# Patient Record
Sex: MALE | Race: WHITE | Employment: OTHER | URBAN - METROPOLITAN AREA
[De-identification: names, ages, dates, MRNs, and addresses within clinical notes are randomized per-mention and may not be internally consistent; named-entity substitution may affect disease eponyms.]

---

## 2017-01-15 ENCOUNTER — APPOINTMENT (OUTPATIENT)
Dept: CT IMAGING | Age: 62
End: 2017-01-15
Attending: EMERGENCY MEDICINE
Payer: COMMERCIAL

## 2017-01-15 ENCOUNTER — APPOINTMENT (OUTPATIENT)
Dept: GENERAL RADIOLOGY | Age: 62
End: 2017-01-15
Attending: EMERGENCY MEDICINE
Payer: COMMERCIAL

## 2017-01-15 ENCOUNTER — HOSPITAL ENCOUNTER (EMERGENCY)
Age: 62
Discharge: HOME | End: 2017-01-15
Attending: EMERGENCY MEDICINE
Payer: COMMERCIAL

## 2017-01-15 VITALS
HEIGHT: 75 IN | BODY MASS INDEX: 26.12 KG/M2 | SYSTOLIC BLOOD PRESSURE: 161 MMHG | DIASTOLIC BLOOD PRESSURE: 70 MMHG | HEART RATE: 91 BPM | TEMPERATURE: 98.6 F | OXYGEN SATURATION: 96 % | RESPIRATION RATE: 16 BRPM | WEIGHT: 210.1 LBS

## 2017-01-15 DIAGNOSIS — N30.01 ACUTE CYSTITIS WITH HEMATURIA: Primary | ICD-10-CM

## 2017-01-15 LAB
ALBUMIN SERPL BCP-MCNC: 3.2 G/DL (ref 3.5–5)
ALBUMIN/GLOB SERPL: 1 {RATIO} (ref 1.1–2.2)
ALP SERPL-CCNC: 68 U/L (ref 45–117)
ALT SERPL-CCNC: 16 U/L (ref 12–78)
ANION GAP BLD CALC-SCNC: 11 MMOL/L (ref 5–15)
APPEARANCE UR: ABNORMAL
AST SERPL W P-5'-P-CCNC: 16 U/L (ref 15–37)
BACTERIA URNS QL MICRO: ABNORMAL /HPF
BASOPHILS # BLD AUTO: 0 K/UL (ref 0–0.1)
BASOPHILS # BLD: 0 % (ref 0–1)
BILIRUB SERPL-MCNC: 0.8 MG/DL (ref 0.2–1)
BILIRUB UR QL: NEGATIVE
BUN SERPL-MCNC: 20 MG/DL (ref 6–20)
BUN/CREAT SERPL: 18 (ref 12–20)
CALCIUM SERPL-MCNC: 8.3 MG/DL (ref 8.5–10.1)
CHLORIDE SERPL-SCNC: 100 MMOL/L (ref 97–108)
CO2 SERPL-SCNC: 25 MMOL/L (ref 21–32)
COLOR UR: ABNORMAL
CREAT SERPL-MCNC: 1.1 MG/DL (ref 0.7–1.3)
DIFFERENTIAL METHOD BLD: ABNORMAL
EOSINOPHIL # BLD: 0 K/UL (ref 0–0.4)
EOSINOPHIL NFR BLD: 0 % (ref 0–7)
EPITH CASTS URNS QL MICRO: ABNORMAL /LPF
ERYTHROCYTE [DISTWIDTH] IN BLOOD BY AUTOMATED COUNT: 13.5 % (ref 11.5–14.5)
GLOBULIN SER CALC-MCNC: 3.3 G/DL (ref 2–4)
GLUCOSE SERPL-MCNC: 96 MG/DL (ref 65–100)
GLUCOSE UR STRIP.AUTO-MCNC: NEGATIVE MG/DL
HCT VFR BLD AUTO: 32.1 % (ref 36.6–50.3)
HGB BLD-MCNC: 11 G/DL (ref 12.1–17)
HGB UR QL STRIP: ABNORMAL
KETONES UR QL STRIP.AUTO: NEGATIVE MG/DL
LACTATE SERPL-SCNC: 1.2 MMOL/L (ref 0.4–2)
LEUKOCYTE ESTERASE UR QL STRIP.AUTO: ABNORMAL
LYMPHOCYTES # BLD AUTO: 8 % (ref 12–49)
LYMPHOCYTES # BLD: 0.8 K/UL (ref 0.8–3.5)
MCH RBC QN AUTO: 33.4 PG (ref 26–34)
MCHC RBC AUTO-ENTMCNC: 34.3 G/DL (ref 30–36.5)
MCV RBC AUTO: 97.6 FL (ref 80–99)
MONOCYTES # BLD: 1.1 K/UL (ref 0–1)
MONOCYTES NFR BLD AUTO: 11 % (ref 5–13)
NEUTS SEG # BLD: 7.9 K/UL (ref 1.8–8)
NEUTS SEG NFR BLD AUTO: 81 % (ref 32–75)
NITRITE UR QL STRIP.AUTO: NEGATIVE
PH UR STRIP: 6 [PH] (ref 5–8)
PLATELET # BLD AUTO: 139 K/UL (ref 150–400)
POTASSIUM SERPL-SCNC: 3.8 MMOL/L (ref 3.5–5.1)
PROT SERPL-MCNC: 6.5 G/DL (ref 6.4–8.2)
PROT UR STRIP-MCNC: 100 MG/DL
RBC # BLD AUTO: 3.29 M/UL (ref 4.1–5.7)
RBC #/AREA URNS HPF: ABNORMAL /HPF (ref 0–5)
RBC MORPH BLD: ABNORMAL
SODIUM SERPL-SCNC: 136 MMOL/L (ref 136–145)
SP GR UR REFRACTOMETRY: 1.02 (ref 1–1.03)
UROBILINOGEN UR QL STRIP.AUTO: 1 EU/DL (ref 0.2–1)
WBC # BLD AUTO: 9.8 K/UL (ref 4.1–11.1)
WBC URNS QL MICRO: ABNORMAL /HPF (ref 0–4)

## 2017-01-15 PROCEDURE — 74176 CT ABD & PELVIS W/O CONTRAST: CPT

## 2017-01-15 PROCEDURE — 74011000258 HC RX REV CODE- 258: Performed by: EMERGENCY MEDICINE

## 2017-01-15 PROCEDURE — 87040 BLOOD CULTURE FOR BACTERIA: CPT | Performed by: EMERGENCY MEDICINE

## 2017-01-15 PROCEDURE — 87077 CULTURE AEROBIC IDENTIFY: CPT | Performed by: EMERGENCY MEDICINE

## 2017-01-15 PROCEDURE — 74011250636 HC RX REV CODE- 250/636: Performed by: EMERGENCY MEDICINE

## 2017-01-15 PROCEDURE — 93005 ELECTROCARDIOGRAM TRACING: CPT

## 2017-01-15 PROCEDURE — 77030029179 HC BAG URIN DRNG SIMS -A

## 2017-01-15 PROCEDURE — 77030005520 HC CATH URETH FOL38 BARD -A

## 2017-01-15 PROCEDURE — 87086 URINE CULTURE/COLONY COUNT: CPT | Performed by: EMERGENCY MEDICINE

## 2017-01-15 PROCEDURE — 80053 COMPREHEN METABOLIC PANEL: CPT | Performed by: EMERGENCY MEDICINE

## 2017-01-15 PROCEDURE — 87186 SC STD MICRODIL/AGAR DIL: CPT | Performed by: EMERGENCY MEDICINE

## 2017-01-15 PROCEDURE — 99285 EMERGENCY DEPT VISIT HI MDM: CPT

## 2017-01-15 PROCEDURE — 96361 HYDRATE IV INFUSION ADD-ON: CPT

## 2017-01-15 PROCEDURE — 81001 URINALYSIS AUTO W/SCOPE: CPT | Performed by: EMERGENCY MEDICINE

## 2017-01-15 PROCEDURE — 77030005530 HC CATH URETH FOL40 BARD -B

## 2017-01-15 PROCEDURE — 71010 XR CHEST PORT: CPT

## 2017-01-15 PROCEDURE — 96365 THER/PROPH/DIAG IV INF INIT: CPT

## 2017-01-15 PROCEDURE — 85025 COMPLETE CBC W/AUTO DIFF WBC: CPT | Performed by: EMERGENCY MEDICINE

## 2017-01-15 PROCEDURE — 83605 ASSAY OF LACTIC ACID: CPT | Performed by: EMERGENCY MEDICINE

## 2017-01-15 PROCEDURE — 36415 COLL VENOUS BLD VENIPUNCTURE: CPT | Performed by: EMERGENCY MEDICINE

## 2017-01-15 RX ORDER — CEPHALEXIN 500 MG/1
500 CAPSULE ORAL 3 TIMES DAILY
Qty: 21 CAP | Refills: 0 | Status: SHIPPED | OUTPATIENT
Start: 2017-01-15 | End: 2017-01-22

## 2017-01-15 RX ORDER — SODIUM CHLORIDE 0.9 % (FLUSH) 0.9 %
5-10 SYRINGE (ML) INJECTION AS NEEDED
Status: DISCONTINUED | OUTPATIENT
Start: 2017-01-15 | End: 2017-01-15 | Stop reason: HOSPADM

## 2017-01-15 RX ADMIN — SODIUM CHLORIDE 2859 ML: 900 INJECTION, SOLUTION INTRAVENOUS at 15:45

## 2017-01-15 RX ADMIN — CEFTRIAXONE 1 G: 1 INJECTION, POWDER, FOR SOLUTION INTRAMUSCULAR; INTRAVENOUS at 17:06

## 2017-01-15 NOTE — ED NOTES
Dr. Dominga Barrientos has reviewed discharge instructions with the patient. The patient verbalized understanding. Pt. A&Ox4, respirations even and unlabored. VS stable as noted in flowsheet. Ambulating independently from department with paperwork and prescriptions in hand.

## 2017-01-15 NOTE — ED NOTES
Pt self-catheterize with a 16fr coude tip catheter. Pt was provided with sterile gloves, catheter and a leg bag and observed while the procedure was done. Wife states urologist mentioned he might go home with a cath, so the catheter was left in and placed on a leg bag for now.

## 2017-01-15 NOTE — DISCHARGE INSTRUCTIONS

## 2017-01-15 NOTE — ED NOTES
Bedside shift change report given to [address] [place] [address] Avenue (oncoming nurse) by Sandhya Wright RN (offgoing nurse). Report included the following information SBAR, ED Summary, MAR and Recent Results.

## 2017-01-15 NOTE — ED PROVIDER NOTES
HPI Comments: Deanna Helm is a 57 y.o. male s/p laparoscopic sigmoidectomy with BL uretal stent placement/removal on 12/28/16 who presents ambulatory to ED [facility] ED with cc of \"dark, stronger smelling\" urine x 4-5 days. Pt additionally c/o headache x 3 days, mild chest tightness today, fever of 102. 8F today which improved on its own, generalized weakness, and decreased appetite. Pt has not taken any medication for his symptoms. Pt reports that he saw Dr. Shanthi Martines on 1/12/17 for f/u of urinary retention s/p surgery and had his leg bag removed. Pt reports he since been tai-cathing daily. Pt reports he called Dr. Shanthi Martines today who recommended he come to the ED for further evaluation. Pt denies any cough, congestion, abdominal pain, nausea, vomiting, diarrhea, constipation, CP, or SOB. PCP: Ila Chao MD  Colorectal Surgeon: Aramis Marley MD  Urologist: [facility]; has seen Dr. Dora Camacho and Dr. Tena Carter Hx: -tobacco, -EtOH, -illicit drug usage    There are no other complaints, changes or physical findings at this time. The history is provided by the patient. Past Medical History:   Diagnosis Date    Arthritis      gouty arthritis in feet & ankles    Gout     HTN (hypertension)        Past Surgical History:   Procedure Laterality Date    Hx urological       bladder    Hx orthopaedic       Lumbar surgery age 24    Hx colonoscopy  2010    Colonoscopy N/A 12/28/2016     COLONOSCOPY performed by Aidan Jones MD at [facility] 56 Hx gi  12/29/2016     LAPAROSCOPIC SIGMOID COLECTOMY/ CYSTOSCOPY/ BILATERAL URETERAL STENT PLACEMENT AND REMOVAL         Family History:   Problem Relation Age of Onset    Heart Disease Father     Diabetes Father     Heart Disease Brother        Social History     Social History    Marital status:      Spouse name: N/A    Number of children: N/A    Years of education: N/A     Occupational History    Not on file.      Social History Main Topics    Smoking status: Never Smoker    Smokeless tobacco: Never Used    Alcohol use No    Drug use: No    Sexual activity: Not on file     Other Topics Concern    Not on file     Social History Narrative         ALLERGIES: Review of patient's allergies indicates no known allergies. Review of Systems   Constitutional: Positive for appetite change (decreased) and fever. Negative for activity change and chills. HENT: Negative for congestion and sore throat. Eyes: Negative for pain and redness. Respiratory: Positive for chest tightness. Negative for cough and shortness of breath. Cardiovascular: Negative for chest pain and palpitations. Gastrointestinal: Negative for abdominal pain, constipation, diarrhea, nausea and vomiting. Genitourinary: Negative for dysuria, frequency and urgency. +\"darker, stronger-smelling\" urine   Musculoskeletal: Negative for back pain and neck pain. Skin: Negative for rash. Neurological: Positive for weakness (generalized) and headaches. Negative for syncope and light-headedness. Psychiatric/Behavioral: Negative for confusion. All other systems reviewed and are negative. Patient Vitals for the past 12 hrs:   Temp Pulse Resp BP SpO2   01/15/17 1700 - - - 150/74 95 %   01/15/17 1600 - - - 150/80 97 %   01/15/17 1500 - - - 144/76 95 %   01/15/17 1430 - - - 135/81 96 %   01/15/17 1410 - - - 133/79 97 %   01/15/17 1358 98.6 °F (37 °C) (!) 108 20 152/87 98 %         Physical Exam   Constitutional: He is oriented to person, place, and time. He appears well-developed and well-nourished. No distress. HENT:   Head: Normocephalic. Nose: Nose normal.   Mouth/Throat: Oropharynx is clear and moist. No oropharyngeal exudate. Eyes: Conjunctivae are normal. Pupils are equal, round, and reactive to light. No scleral icterus. Neck: Normal range of motion. Neck supple. No JVD present. No tracheal deviation present. No thyromegaly present. Cardiovascular: Normal rate, regular rhythm and intact distal pulses. Exam reveals no gallop and no friction rub. No murmur heard. Pulmonary/Chest: Effort normal and breath sounds normal. No stridor. No respiratory distress. He has no wheezes. He has no rales. Abdominal: Soft. Bowel sounds are normal. He exhibits no distension. There is no tenderness. There is no rebound and no guarding. Well healed laparoscopic scars in the lower abdomen and periumbilical area   Musculoskeletal: Normal range of motion. He exhibits no edema. Lymphadenopathy:     He has no cervical adenopathy. Neurological: He is alert and oriented to person, place, and time. No cranial nerve deficit. He exhibits normal muscle tone. Coordination normal.   Skin: Skin is warm and dry. No rash noted. He is not diaphoretic. No erythema. Psychiatric: He has a normal mood and affect. His behavior is normal.   Nursing note and vitals reviewed. MDM  Number of Diagnoses or Management Options  Acute cystitis with hematuria:   Diagnosis management comments: DDx: UTI, pneumonia, viral syndome       Amount and/or Complexity of Data Reviewed  Clinical lab tests: ordered and reviewed  Tests in the radiology section of CPT®: ordered and reviewed  Tests in the medicine section of CPT®: reviewed and ordered  Review and summarize past medical records: yes  Discuss the patient with other providers: yes (Urology)  Independent visualization of images, tracings, or specimens: yes    Risk of Complications, Morbidity, and/or Mortality  General comments: +UTI; afebrile here; vss; no increased wbc; ortiz placed; will dc with leg bag; pt already established with urology; ct without hydro or acute process; d/w urology; dc with keflex; pt agreed to return if any continued fevers, abd pain; Emily Messina MD      Patient Progress  Patient progress: stable    ED Course       Procedures    PROGRESS NOTE:  2:44 PM  Old records reviewed.  Pt was admitted on 11/17/16 for perforated diverticulitis which was drained percutaneously. He was then admitted on 12/28/16 by Dr. Negin Ba for laparoscopic sigmoidectomy. Also on 12/28/16 pt had a cystoscopy, BL uretal stent placement and subsequent removal by Dr. Los Rivera. Written by Lachelle Whiteside, ED Scribe, as dictated by Natalie Lindsay MD.    ED EKG interpretation: 15:19  Rhythm: normal sinus rhythm; and regular . Rate (approx.): 93; Axis: normal; GA Interval: normal; QRS interval: normal ; ST/T wave: normal; normal ekg; This EKG was interpreted by Natalie Lindsay MD,ED Provider. CONSULT NOTE:   4:26 PM  Natalie Lindsay MD spoke with Brynn Bose MD,   Specialty: Urology  Discussed pt's hx, disposition, and available diagnostic and imaging results. Reviewed care plans. Consultant agrees with plans as outlined. Dr. Bailey Argueta requested CT abdomen/pelvis and if negative d/c with ortiz + leg bag and PO antibiotics. Written by Brenton Hawkins, ED Scribe, as dictated by Natalie Lindsay MD.    LABORATORY TESTS:  Recent Results (from the past 12 hour(s))   EKG, 12 LEAD, INITIAL    Collection Time: 01/15/17  3:19 PM   Result Value Ref Range    Ventricular Rate 93 BPM    Atrial Rate 93 BPM    P-R Interval 152 ms    QRS Duration 84 ms    Q-T Interval 340 ms    QTC Calculation (Bezet) 422 ms    Calculated P Axis 17 degrees    Calculated R Axis 10 degrees    Calculated T Axis 48 degrees    Diagnosis       Normal sinus rhythm  Normal ECG  When compared with ECG of 22-DEC-2016 08:46,  Vent.  rate has increased BY  33 BPM     LACTIC ACID, PLASMA    Collection Time: 01/15/17  3:37 PM   Result Value Ref Range    Lactic acid 1.2 0.4 - 2.0 MMOL/L   URINALYSIS W/ RFLX MICROSCOPIC    Collection Time: 01/15/17  3:37 PM   Result Value Ref Range    Color YELLOW/STRAW      Appearance CLOUDY (A) CLEAR      Specific gravity 1.021 1.003 - 1.030      pH (UA) 6.0 5.0 - 8.0      Protein 100 (A) NEG mg/dL    Glucose NEGATIVE  NEG mg/dL    Ketone NEGATIVE NEG mg/dL    Bilirubin NEGATIVE  NEG      Blood LARGE (A) NEG      Urobilinogen 1.0 0.2 - 1.0 EU/dL    Nitrites NEGATIVE  NEG      Leukocyte Esterase SMALL (A) NEG      WBC 10-20 0 - 4 /hpf    RBC 20-50 0 - 5 /hpf    Epithelial cells FEW FEW /lpf    Bacteria 4+ (A) NEG /hpf   METABOLIC PANEL, COMPREHENSIVE    Collection Time: 01/15/17  3:37 PM   Result Value Ref Range    Sodium 136 136 - 145 mmol/L    Potassium 3.8 3.5 - 5.1 mmol/L    Chloride 100 97 - 108 mmol/L    CO2 25 21 - 32 mmol/L    Anion gap 11 5 - 15 mmol/L    Glucose 96 65 - 100 mg/dL    BUN 20 6 - 20 MG/DL    Creatinine 1.10 0.70 - 1.30 MG/DL    BUN/Creatinine ratio 18 12 - 20      GFR est AA >60 >60 ml/min/1.73m2    GFR est non-AA >60 >60 ml/min/1.73m2    Calcium 8.3 (L) 8.5 - 10.1 MG/DL    Bilirubin, total 0.8 0.2 - 1.0 MG/DL    ALT 16 12 - 78 U/L    AST 16 15 - 37 U/L    Alk. phosphatase 68 45 - 117 U/L    Protein, total 6.5 6.4 - 8.2 g/dL    Albumin 3.2 (L) 3.5 - 5.0 g/dL    Globulin 3.3 2.0 - 4.0 g/dL    A-G Ratio 1.0 (L) 1.1 - 2.2     CBC WITH AUTOMATED DIFF    Collection Time: 01/15/17  3:37 PM   Result Value Ref Range    WBC 9.8 4.1 - 11.1 K/uL    RBC 3.29 (L) 4.10 - 5.70 M/uL    HGB 11.0 (L) 12.1 - 17.0 g/dL    HCT 32.1 (L) 36.6 - 50.3 %    MCV 97.6 80.0 - 99.0 FL    MCH 33.4 26.0 - 34.0 PG    MCHC 34.3 30.0 - 36.5 g/dL    RDW 13.5 11.5 - 14.5 %    PLATELET 730 (L) 888 - 400 K/uL    NEUTROPHILS 81 (H) 32 - 75 %    LYMPHOCYTES 8 (L) 12 - 49 %    MONOCYTES 11 5 - 13 %    EOSINOPHILS 0 0 - 7 %    BASOPHILS 0 0 - 1 %    ABS. NEUTROPHILS 7.9 1.8 - 8.0 K/UL    ABS. LYMPHOCYTES 0.8 0.8 - 3.5 K/UL    ABS. MONOCYTES 1.1 (H) 0.0 - 1.0 K/UL    ABS. EOSINOPHILS 0.0 0.0 - 0.4 K/UL    ABS. BASOPHILS 0.0 0.0 - 0.1 K/UL    DF SMEAR SCANNED      RBC COMMENTS NORMOCYTIC, NORMOCHROMIC         IMAGING RESULTS:  CT Results  (Last 48 hours)               01/15/17 1726  CT ABD PELV WO CONT Final result    Impression:  IMPRESSION:       1.  Postoperative changes are noted from sigmoid colectomy. No drainable abscess   is identified. There is a small nonobstructing calculus lower pole left kidney. Narrative:  INDICATION: Abd Pain/KS; recent sigmoidectomy and ureteral stent placement 12/28       COMPARISON: 2016       TECHNIQUE:    Thin axial images were obtained through the abdomen and pelvis. Coronal and   sagittal reconstructions were generated. Oral contrast was not administered. CT   dose reduction was achieved through use of a standardized protocol tailored for   this examination and automatic exposure control for dose modulation. The absence of intravenous contrast material reduces the sensitivity for   evaluation of the solid parenchymal organs of the abdomen. FINDINGS:    LUNG BASES: There is a small focus of airspace disease in the lingula. There are   slight basilar atelectasis right greater than left. INCIDENTALLY IMAGED HEART AND MEDIASTINUM: Unremarkable. LIVER: No mass or biliary dilatation. There is a 5 mm low-density in the liver   most likely a tiny cyst.   GALLBLADDER: There is suggestion of sludge within the gallbladder   SPLEEN: No mass. PANCREAS: No mass or ductal dilatation. ADRENALS: Unremarkable. KIDNEYS/URETERS: There is a 4 mm nonobstructing calculus lower pole left kidney. No hydroureteronephrosis is identified   STOMACH: Unremarkable. SMALL BOWEL: No dilatation or wall thickening. COLON: Postoperative changes are noted from sigmoid colectomy with slight   haziness. No drainable abscess is identified. APPENDIX: Unremarkable. PERITONEUM: No ascites or pneumoperitoneum. RETROPERITONEUM: No lymphadenopathy or aortic aneurysm. REPRODUCTIVE ORGANS:   URINARY BLADDER: Paulson catheter is noted within the urinary bladder   BONES: No destructive bone lesion.    ADDITIONAL COMMENTS: N/A               CXR Results  (Last 48 hours)               01/15/17 1520  XR CHEST PORT Final result    Impression:  IMPRESSION: No acute abnormality is identified               Narrative:  EXAM:  XR CHEST PORT       INDICATION:  Sepsis       COMPARISON:  2016       FINDINGS: A portable AP radiograph of the chest was obtained at 1513 hours. .    Lungs are clear of an acute process allowing for technique. .  The cardiac and   mediastinal contours and pulmonary vascularity are normal.  There is   degenerative spurring mid lower thoracic spine. There are degenerative changes   right shoulder. MEDICATIONS GIVEN:  Medications   sodium chloride (NS) flush 5-10 mL (not administered)   sodium chloride 0.9 % bolus infusion 2,859 mL (2,859 mL IntraVENous New Bag 1/15/17 1545)   cefTRIAXone (ROCEPHIN) 1 g in 0.9% sodium chloride (MBP/ADV) 50 mL (1 g IntraVENous New Bag 1/15/17 1706)       IMPRESSION:  1. Acute cystitis with hematuria        PLAN:  Current Discharge Medication List      START taking these medications    Details   cephALEXin (KEFLEX) 500 mg capsule Take 1 Cap by mouth three (3) times daily for 7 days. Qty: 21 Cap, Refills: 0         CONTINUE these medications which have NOT CHANGED    Details   HYDROcodone-acetaminophen (NORCO) 5-325 mg per tablet Take 1 Tab by mouth every four (4) hours as needed for Pain. Max Daily Amount: 6 Tabs. Qty: 40 Tab, Refills: 0      tamsulosin (FLOMAX) 0.4 mg capsule Take 1 Cap by mouth daily. Qty: 30 Cap, Refills: 1      multivitamin with iron tablet Take 1 Tab by mouth daily. colchicine-probenecid (COLBENEMID) 0.5-500 mg per tablet TAKE ONE TABLET BY MOUTH TWICE DAILY  Qty: 60 Tab, Refills: 4           Follow-up Information     Follow up With Details Comments [address], [place] Schedule an appointment as soon as possible for a visit in 2 days  [address] [place] [address]  [phone]          Return to ED if worse     DISCHARGE NOTE:  5:44 PM  Pt has been reexamined. Pt has no new complaints, changes, or physical findings.  Care plan outlined and precautions discussed. All available results reviewed with pt. All medications reviewed with pt. All of pts questions and concerns addressed. Pt agrees to f/u as instructed and agrees to return to ED upon further deterioration. Pt is ready to go home. ATTESTATION:  This note is prepared by Edd Ramsey, acting as Scribe for Caitlin Driver MD.    Caitlin Driver MD and personally reviewed by me in its entirety. I confirm that the note above accurately reflects all work, treatment, procedures, and medical decision making performed by me.

## 2017-01-16 LAB
ATRIAL RATE: 93 BPM
CALCULATED P AXIS, ECG09: 17 DEGREES
CALCULATED R AXIS, ECG10: 10 DEGREES
CALCULATED T AXIS, ECG11: 48 DEGREES
DIAGNOSIS, 93000: NORMAL
P-R INTERVAL, ECG05: 152 MS
Q-T INTERVAL, ECG07: 340 MS
QRS DURATION, ECG06: 84 MS
QTC CALCULATION (BEZET), ECG08: 422 MS
VENTRICULAR RATE, ECG03: 93 BPM

## 2017-01-17 LAB
BACTERIA SPEC CULT: NORMAL
CC UR VC: NORMAL
SERVICE CMNT-IMP: NORMAL

## 2017-01-21 LAB
BACTERIA SPEC CULT: NORMAL
BACTERIA SPEC CULT: NORMAL
SERVICE CMNT-IMP: NORMAL
SERVICE CMNT-IMP: NORMAL

## 2017-03-31 ENCOUNTER — HOSPITAL ENCOUNTER (OUTPATIENT)
Dept: ULTRASOUND IMAGING | Age: 62
Discharge: HOME | End: 2017-03-31
Attending: FAMILY MEDICINE
Payer: COMMERCIAL

## 2017-03-31 DIAGNOSIS — R60.0 EDEMA, LOWER EXTREMITY: ICD-10-CM

## 2017-03-31 DIAGNOSIS — R79.89: ICD-10-CM

## 2017-03-31 PROCEDURE — 93971 EXTREMITY STUDY: CPT

## 2017-11-16 ENCOUNTER — HOSPITAL ENCOUNTER (OUTPATIENT)
Dept: LAB | Age: 62
Discharge: HOME | End: 2017-11-16

## 2017-11-16 ENCOUNTER — HOSPITAL ENCOUNTER (EMERGENCY)
Age: 62
Discharge: HOME | End: 2017-11-16
Attending: FAMILY MEDICINE

## 2017-11-16 VITALS
DIASTOLIC BLOOD PRESSURE: 72 MMHG | WEIGHT: 219 LBS | HEART RATE: 68 BPM | BODY MASS INDEX: 27.23 KG/M2 | TEMPERATURE: 97.6 F | HEIGHT: 75 IN | SYSTOLIC BLOOD PRESSURE: 138 MMHG | OXYGEN SATURATION: 98 % | RESPIRATION RATE: 16 BRPM

## 2017-11-16 DIAGNOSIS — N39.0 URINARY TRACT INFECTION WITH HEMATURIA, SITE UNSPECIFIED: Primary | ICD-10-CM

## 2017-11-16 DIAGNOSIS — R31.9 URINARY TRACT INFECTION WITH HEMATURIA, SITE UNSPECIFIED: Primary | ICD-10-CM

## 2017-11-16 LAB
BILIRUB UR QL: NEGATIVE
GLUCOSE UR QL STRIP.AUTO: NEGATIVE MG/DL
KETONES UR-MCNC: NEGATIVE MG/DL
LEUKOCYTE ESTERASE UR QL STRIP: ABNORMAL
NITRITE UR QL: POSITIVE
PH UR: 6 [PH] (ref 5–8)
PROT UR QL: ABNORMAL MG/DL
RBC # UR STRIP: ABNORMAL /UL
SP GR UR: 1.01 (ref 1–1.03)
UROBILINOGEN UR QL: 0.2 EU/DL (ref 0.2–1)

## 2017-11-16 PROCEDURE — 87086 URINE CULTURE/COLONY COUNT: CPT | Performed by: FAMILY MEDICINE

## 2017-11-16 PROCEDURE — 87186 SC STD MICRODIL/AGAR DIL: CPT | Performed by: FAMILY MEDICINE

## 2017-11-16 PROCEDURE — 87077 CULTURE AEROBIC IDENTIFY: CPT | Performed by: FAMILY MEDICINE

## 2017-11-16 RX ORDER — CEFDINIR 300 MG/1
300 CAPSULE ORAL 2 TIMES DAILY
Qty: 20 CAP | Refills: 0 | Status: SHIPPED | OUTPATIENT
Start: 2017-11-16 | End: 2020-08-10 | Stop reason: ALTCHOICE

## 2017-11-17 NOTE — DISCHARGE INSTRUCTIONS

## 2017-11-18 LAB
BACTERIA SPEC CULT: ABNORMAL
CC UR VC: ABNORMAL
SERVICE CMNT-IMP: ABNORMAL

## 2017-11-20 NOTE — UC PROVIDER NOTE
Patient is a 60 y.o. male presenting with urinary pain. The history is provided by the patient. Urinary Pain    This is a new problem. The current episode started more than 2 days ago. The problem occurs intermittently. The problem has not changed since onset. The quality of the pain is described as burning. The pain is at a severity of 2/10. There has been no fever. Associated symptoms include hematuria. Pertinent negatives include no chills, no penile discharge and no back pain. He has tried nothing for the symptoms. Past medical history comments: atonic bladder- slef cath 4 times/ day . Past Medical History:   Diagnosis Date    Arthritis     gouty arthritis in feet & ankles    Gout     HTN (hypertension)         Past Surgical History:   Procedure Laterality Date    COLONOSCOPY N/A 12/28/2016    COLONOSCOPY performed by Betito Flowers MD at [facility] AMBULATORY OR    HX COLONOSCOPY  2010    HX GI  12/29/2016    LAPAROSCOPIC SIGMOID COLECTOMY/ CYSTOSCOPY/ BILATERAL URETERAL STENT PLACEMENT AND REMOVAL    HX ORTHOPAEDIC      Lumbar surgery age 24    HX UROLOGICAL      bladder         Family History   Problem Relation Age of Onset    Heart Disease Father     Diabetes Father     Heart Disease Brother         Social History     Social History    Marital status:      Spouse name: N/A    Number of children: N/A    Years of education: N/A     Occupational History    Not on file. Social History Main Topics    Smoking status: Never Smoker    Smokeless tobacco: Never Used    Alcohol use No    Drug use: No    Sexual activity: Not on file     Other Topics Concern    Not on file     Social History Narrative                ALLERGIES: Review of patient's allergies indicates no known allergies. Review of Systems   Constitutional: Negative for chills. Genitourinary: Positive for hematuria. Negative for penile discharge. Musculoskeletal: Negative for back pain.    All other systems reviewed and are negative. Vitals:    11/16/17 1944   BP: 138/72   Pulse: 68   Resp: 16   Temp: 97.6 °F (36.4 °C)   SpO2: 98%   Weight: 99.3 kg (219 lb)   Height: 6' 3\" (1.905 m)       Physical Exam   Constitutional: No distress. HENT:   Mouth/Throat: No oropharyngeal exudate. Eyes: No scleral icterus. Abdominal: Soft. Bowel sounds are normal. He exhibits no distension and no mass. There is no tenderness. There is no rebound and no guarding. Skin: No rash noted. Nursing note and vitals reviewed. MDM     Differential Diagnosis; Clinical Impression; Plan:     CLINICAL IMPRESSION:  Urinary tract infection with hematuria, site unspecified  (primary encounter diagnosis)      DDX    Plan:    staert omnicef 300 mg bid  Follow urine culture  Follow with urologist   If sxs worsen go to Ed  Amount and/or Complexity of Data Reviewed:    Review and summarize past medical records:  Yes  Risk of Significant Complications, Morbidity, and/or Mortality:   Presenting problems: Moderate  Management options:   Moderate  Progress:   Patient progress:  Stable      Procedures

## 2018-07-14 ENCOUNTER — OFFICE VISIT (OUTPATIENT)
Dept: URGENT CARE | Age: 63
End: 2018-07-14

## 2018-07-14 VITALS
TEMPERATURE: 99.3 F | WEIGHT: 220 LBS | OXYGEN SATURATION: 97 % | BODY MASS INDEX: 27.35 KG/M2 | DIASTOLIC BLOOD PRESSURE: 78 MMHG | HEIGHT: 75 IN | HEART RATE: 80 BPM | SYSTOLIC BLOOD PRESSURE: 132 MMHG | RESPIRATION RATE: 16 BRPM

## 2018-07-14 DIAGNOSIS — N39.0 URINARY TRACT INFECTION ASSOCIATED WITH CATHETERIZATION OF URINARY TRACT, UNSPECIFIED INDWELLING URINARY CATHETER TYPE, INITIAL ENCOUNTER (HCC): Primary | ICD-10-CM

## 2018-07-14 DIAGNOSIS — R39.9 URINARY SYMPTOM OR SIGN: ICD-10-CM

## 2018-07-14 DIAGNOSIS — T83.511A URINARY TRACT INFECTION ASSOCIATED WITH CATHETERIZATION OF URINARY TRACT, UNSPECIFIED INDWELLING URINARY CATHETER TYPE, INITIAL ENCOUNTER (HCC): Primary | ICD-10-CM

## 2018-07-14 LAB
BILIRUB UR QL STRIP: NEGATIVE
GLUCOSE UR-MCNC: NEGATIVE MG/DL
KETONES P FAST UR STRIP-MCNC: ABNORMAL MG/DL
PH UR STRIP: 7.5 [PH] (ref 4.6–8)
PROT UR QL STRIP: ABNORMAL
SP GR UR STRIP: 1.01 (ref 1–1.03)
UA UROBILINOGEN AMB POC: ABNORMAL (ref 0.2–1)
URINALYSIS CLARITY POC: ABNORMAL
URINALYSIS COLOR POC: ABNORMAL
URINE BLOOD POC: NEGATIVE
URINE LEUKOCYTES POC: ABNORMAL
URINE NITRITES POC: NEGATIVE

## 2018-07-14 RX ORDER — CIPROFLOXACIN 500 MG/1
500 TABLET ORAL 2 TIMES DAILY
Qty: 14 TAB | Refills: 0 | Status: SHIPPED | OUTPATIENT
Start: 2018-07-14 | End: 2018-07-21

## 2018-07-14 NOTE — PROGRESS NOTES
HPI Comments:     Cloudy urine and urgency onset 4-5 days ago. Some chills today. Denies back pain, nausea, vomiting, fever or dizziness. He self caths due to urologic abnormality; is managed by urology. Drinking plenty of fluids. No other associated symptoms. No aggravating or alleviating factors. Overall worsening. Patient is a 58 y.o. male presenting with frequency. Urinary Frequency          Past Medical History:   Diagnosis Date    Arthritis     gouty arthritis in feet & ankles    Gout     HTN (hypertension)         Past Surgical History:   Procedure Laterality Date    COLONOSCOPY N/A 12/28/2016    COLONOSCOPY performed by Adele Fernandez MD at [facility] AMBULATORY OR    HX COLONOSCOPY  2010    HX GI  12/29/2016    LAPAROSCOPIC SIGMOID COLECTOMY/ CYSTOSCOPY/ BILATERAL URETERAL STENT PLACEMENT AND REMOVAL    HX ORTHOPAEDIC      Lumbar surgery age 24    HX UROLOGICAL      bladder         Family History   Problem Relation Age of Onset    Heart Disease Father     Diabetes Father     Heart Disease Brother         Social History     Social History    Marital status:      Spouse name: N/A    Number of children: N/A    Years of education: N/A     Occupational History    Not on file. Social History Main Topics    Smoking status: Never Smoker    Smokeless tobacco: Never Used    Alcohol use No    Drug use: No    Sexual activity: Not on file     Other Topics Concern    Not on file     Social History Narrative                ALLERGIES: Review of patient's allergies indicates no known allergies. Review of Systems   Constitutional: Positive for chills. Negative for fatigue. Genitourinary: Positive for frequency. Neurological: Negative for dizziness. All other systems reviewed and are negative.       Vitals:    07/14/18 1316 07/14/18 1357   BP: (!) 176/100 132/78   Pulse: 80    Resp: 16    Temp: 99.3 °F (37.4 °C)    SpO2: 97%    Weight: 220 lb (99.8 kg)    Height: 6' 3\" (1.905 m)        Physical Exam   Constitutional: He is oriented to person, place, and time. Non toxic appearing    HENT:   Mouth/Throat: Oropharynx is clear and moist.   Eyes: EOM are normal. Pupils are equal, round, and reactive to light. Cardiovascular: Normal rate, regular rhythm and normal heart sounds. Exam reveals no gallop and no friction rub. No murmur heard. Pulmonary/Chest: Effort normal and breath sounds normal. No respiratory distress. He has no wheezes. He has no rales. Abdominal: Soft. He exhibits no distension. There is no tenderness. There is no rebound and no guarding. Musculoskeletal:   No CVA tenderness   Neurological: He is alert and oriented to person, place, and time. Skin: Skin is warm and dry. No pallor. Psychiatric: He has a normal mood and affect. His behavior is normal. Thought content normal.       MDM     Differential Diagnosis; Clinical Impression; Plan:       CLINICAL IMPRESSION:  (T83.511A,  N39.0) Urinary tract infection associated with catheterization of urinary tract, unspecified indwelling urinary catheter type, [facility]  (primary encounter diagnosis)  (R39.9) Urinary symptom or sign    Orders Placed This Encounter      CULTURE, URINE      AMB POC URINALYSIS DIP STICK AUTO W/O MICRO    RX      ciprofloxacin HCl (CIPRO) 500 mg tablet      Plan:  Hold (dont take) colchicine for gout while on Cipro  Maintain adequate fluid intake. Call your Urologist on Monday and schedule follow up/let them know your current treatment plan. Will send off urine culture to ensure we are treating appropriately. Go to the ED immediately for any new or worsening. We have reviewed concerning signs/symptoms, normal vs abnormal progression of medical condition and when to seek immediate medical attention. You should see your PCP for updates on your routine health maintenance.            Procedures

## 2018-07-14 NOTE — PATIENT INSTRUCTIONS
Hold (dont take) colchicine for gout while on Cipro  Maintain adequate fluid intake. Call your Urologist on Monday and schedule follow up/let them know your current treatment plan. Will send off urine culture to ensure we are treating appropriately. Go to the ED immediately for any new or worsening. Urinary Tract Infections in Men: Care Instructions  Your Care Instructions    A urinary tract infection, or UTI, is a general term for an infection anywhere between the kidneys and the tip of the penis. UTIs can also be a result of a prostate problem. Most cause pain or burning when you urinate. Most UTIs are caused by bacteria and can be cured with antibiotics. It is important to complete your treatment so that the infection does not get worse. Follow-up care is a key part of your treatment and safety. Be sure to make and go to all appointments, and call your doctor if you are having problems. It's also a good idea to know your test results and keep a list of the medicines you take. How can you care for yourself at home? · Take your antibiotics as prescribed. Do not stop taking them just because you feel better. You need to take the full course of antibiotics. · Take your medicines exactly as prescribed. Your doctor may have prescribed a medicine, such as phenazopyridine (Pyridium), to help relieve pain when you urinate. This turns your urine orange. You may stop taking it when your symptoms get better. But be sure to take all of your antibiotics, which treat the infection. · Drink extra water for the next day or two. This will help make the urine less concentrated and help wash out the bacteria causing the infection. (If you have kidney, heart, or liver disease and have to limit your fluids, talk with your doctor before you increase your fluid intake.)  · Avoid drinks that are carbonated or have caffeine. They can irritate the bladder. · Urinate often. Try to empty your bladder each time.   · To relieve pain, take a hot bath or lay a heating pad (set on low) over your lower belly or genital area. Never go to sleep with a heating pad in place. To help prevent UTIs  · Drink plenty of fluids, enough so that your urine is light yellow or clear like water. If you have kidney, heart, or liver disease and have to limit fluids, talk with your doctor before you increase the amount of fluids you drink. · Urinate when you have the urge. Do not hold your urine for a long time. Urinate before you go to sleep. · Keep your penis clean. Catheter care  If you have a drainage tube (catheter) in place, the following steps will help you care for it. · Always wash your hands before and after touching your catheter. · Check the area around the urethra for inflammation or signs of infection. Signs of infection include irritated, swollen, red, or tender skin, or pus around the catheter. · Clean the area around the catheter with soap and water two times a day. Dry with a clean towel afterward. · Do not apply powder or lotion to the skin around the catheter. To empty the urine collection bag  · Wash your hands with soap and water. · Without touching the drain spout, remove the spout from its sleeve at the bottom of the collection bag. Open the valve on the spout. · Let the urine flow out of the bag and into the toilet or a container. Do not let the tubing or drain spout touch anything. · After you empty the bag, clean the end of the drain spout with tissue and water. Close the valve and put the drain spout back into its sleeve at the bottom of the collection bag. · Wash your hands with soap and water. When should you call for help? Call your doctor now or seek immediate medical care if:    · Symptoms such as a fever, chills, nausea, or vomiting get worse or happen for the first time.     · You have new pain in your back just below your rib cage. This is called flank pain.     · There is new blood or pus in your urine.   · You are not able to take or keep down your antibiotics.    Watch closely for changes in your health, and be sure to contact your doctor if:    · You are not getting better after taking an antibiotic for 2 days.     · Your symptoms go away but then come back. Where can you learn more? Go to http://damon-salvador.info/. Enter W474 in the search box to learn more about \"Urinary Tract Infections in Men: Care Instructions. \"  Current as of: May 12, 2017  Content Version: 11.7  © 8360-3919 [employer]. Care instructions adapted under license by [employer] (which disclaims liability or warranty for this information). If you have questions about a medical condition or this instruction, always ask your healthcare professional. [address] any warranty or liability for your use of this information.

## 2018-07-14 NOTE — MR AVS SNAPSHOT
[address] [address] 
[phone] Patient: Lorenzo Thapa MRN: [id number] HTL:8[birth year] Visit Information Date & Time Provider Department Dept. Phone Encounter #  
 7/14/2018 11:15 AM [address] [facility] [phone] [id number] Upcoming Health Maintenance Date Due Hepatitis C Screening 1955 DTaP/Tdap/Td series (1 - Tdap) 4/5/1975 FOBT Q 1 YEAR AGE 50-75 4/5/2004 ZOSTER VACCINE AGE 60> 2/5/2014 Influenza Age 5 to Adult 8/1/2018 Allergies as of 7/14/2018  Review Complete On: 7/14/2018 By: Shiv Jansen NP No Known Allergies Current Immunizations  Reviewed on 1/15/2017 Name Date Influenza Vaccine (Quad) PF 11/25/2016  1:45 PM  
  
 Not reviewed this visit You Were Diagnosed With   
  
 Codes Comments Urinary tract infection associated with catheterization of urinary tract, unspecified indwelling urinary catheter type, initial encounter ([facility]    -  Primary ICD-10-CM: T83.511A, N39.0 ICD-9-CM: 996.64, 599.0 Urinary symptom or sign     ICD-10-CM: R39.9 ICD-9-CM: 788.99 Vitals BP Pulse Temp Resp Height(growth percentile) Weight(growth percentile) 132/78 80 99.3 °F (37.4 °C) 16 6' 3\" (1.905 m) 220 lb (99.8 kg) SpO2 BMI Smoking Status 97% 27.5 kg/m2 Never Smoker Vitals History BMI and BSA Data Body Mass Index Body Surface Area  
 27.5 kg/m 2 2.3 m 2 Preferred Pharmacy Pharmacy Name Phone [facility] [address] [facility], [address] [place] [address] AT [address] [place] [address] [phone] Your Updated Medication List  
  
   
This list is accurate as of 7/14/18  2:07 PM.  Always use your most recent med list.  
  
  
  
  
 cefdinir 300 mg capsule Commonly known as:  OMNICEF Take 1 Cap by mouth two (2) times a day. ciprofloxacin HCl 500 mg tablet Commonly known as:  CIPRO Take 1 Tab by mouth two (2) times a day for 7 days. multivitamin with iron tablet Take 1 Tab by mouth daily. probenecid-colchicine 500-0.5 mg per tablet Commonly known as:  ColBenemid TAKE ONE TABLET BY MOUTH TWICE DAILY Prescriptions Sent to Pharmacy Refills  
 ciprofloxacin HCl (CIPRO) 500 mg tablet 0 Sig: Take 1 Tab by mouth two (2) times a day for 7 days. Class: Normal  
 Pharmacy: [facility] [address] [place] [address], [address] [place] [address] Emmanuel Dominguez  #: [phone] Route: Oral  
  
We Performed the Following AMB POC URINALYSIS DIP STICK AUTO W/O MICRO [29919 CPT(R)] CULTURE, URINE [id number] CPT(R)] Patient Instructions Hold (dont take) colchicine for gout while on Cipro Maintain adequate fluid intake. Call your Urologist on Monday and schedule follow up/let them know your current treatment plan. Will send off urine culture to ensure we are treating appropriately. Go to the ED immediately for any new or worsening. Urinary Tract Infections in Men: Care Instructions Your Care Instructions A urinary tract infection, or UTI, is a general term for an infection anywhere between the kidneys and the tip of the penis. UTIs can also be a result of a prostate problem. Most cause pain or burning when you urinate. Most UTIs are caused by bacteria and can be cured with antibiotics. It is important to complete your treatment so that the infection does not get worse. Follow-up care is a key part of your treatment and safety. Be sure to make and go to all appointments, and call your doctor if you are having problems. It's also a good idea to know your test results and keep a list of the medicines you take. How can you care for yourself at home? · Take your antibiotics as prescribed. Do not stop taking them just because you feel better. You need to take the full course of antibiotics. · Take your medicines exactly as prescribed. Your doctor may have prescribed a medicine, such as phenazopyridine (Pyridium), to help relieve pain when you urinate. This turns your urine orange. You may stop taking it when your symptoms get better. But be sure to take all of your antibiotics, which treat the infection. · Drink extra water for the next day or two. This will help make the urine less concentrated and help wash out the bacteria causing the infection. (If you have kidney, heart, or liver disease and have to limit your fluids, talk with your doctor before you increase your fluid intake.) · Avoid drinks that are carbonated or have caffeine. They can irritate the bladder. · Urinate often. Try to empty your bladder each time. · To relieve pain, take a hot bath or lay a heating pad (set on low) over your lower belly or genital area. Never go to sleep with a heating pad in place. To help prevent UTIs · Drink plenty of fluids, enough so that your urine is light yellow or clear like water. If you have kidney, heart, or liver disease and have to limit fluids, talk with your doctor before you increase the amount of fluids you drink. · Urinate when you have the urge. Do not hold your urine for a long time. Urinate before you go to sleep. · Keep your penis clean. Catheter care If you have a drainage tube (catheter) in place, the following steps will help you care for it. · Always wash your hands before and after touching your catheter. · Check the area around the urethra for inflammation or signs of infection. Signs of infection include irritated, swollen, red, or tender skin, or pus around the catheter. · Clean the area around the catheter with soap and water two times a day. Dry with a clean towel afterward. · Do not apply powder or lotion to the skin around the catheter. To empty the urine collection bag · Wash your hands with soap and water. · Without touching the drain spout, remove the spout from its sleeve at the bottom of the collection bag. Open the valve on the spout. · Let the urine flow out of the bag and into the toilet or a container. Do not let the tubing or drain spout touch anything. · After you empty the bag, clean the end of the drain spout with tissue and water. Close the valve and put the drain spout back into its sleeve at the bottom of the collection bag. · Wash your hands with soap and water. When should you call for help? Call your doctor now or seek immediate medical care if: 
  · Symptoms such as a fever, chills, nausea, or vomiting get worse or happen for the first time.  
  · You have new pain in your back just below your rib cage. This is called flank pain.  
  · There is new blood or pus in your urine.  
  · You are not able to take or keep down your antibiotics.  
 Watch closely for changes in your health, and be sure to contact your doctor if: 
  · You are not getting better after taking an antibiotic for 2 days.  
  · Your symptoms go away but then come back. Where can you learn more? Go to http://damon-salvador.info/. Enter G213 in the search box to learn more about \"Urinary Tract Infections in Men: Care Instructions. \" Current as of: May 12, 2017 Content Version: 11.7 © 5921-9398 [employer]. Care instructions adapted under license by [employer] (which disclaims liability or warranty for this information). If you have questions about a medical condition or this instruction, always ask your healthcare professional. [place] [address] any warranty or liability for your use of this information. Introducing \[facility]\"" & HEALTH SERVICES! [employer] introduces NextDocs patient portal. Now you can access parts of your medical record, email your doctor's office, and request medication refills online.    
 
1. In your internet browser, go to [email or web address] 2. Click on the First Time User? Click Here link in the Sign In box. You will see the New Member Sign Up page. 3. Enter your Pavlok Access Code exactly as it appears below. You will not need to use this code after youve completed the sign-up process. If you do not sign up before the expiration date, you must request a new code. · Pavlok Access Code: [email or web address] Expires: 10/12/2018  2:07 PM 
 
4. Enter the last four digits of your Social Security Number (xxxx) and Date of Birth (mm/dd/yyyy) as indicated and click Submit. You will be taken to the next sign-up page. 5. Create a [employer] ID. This will be your Pavlok login ID and cannot be changed, so think of one that is secure and easy to remember. 6. Create a Pavlok password. You can change your password at any time. 7. Enter your Password Reset Question and Answer. This can be used at a later time if you forget your password. 8. Enter your e-mail address. You will receive e-mail notification when new information is available in [address] 9. Click Sign Up. You can now view and download portions of your medical record. 10. Click the Download Summary menu link to download a portable copy of your medical information. If you have questions, please visit the Frequently Asked Questions section of the Pavlok website. Remember, Pavlok is NOT to be used for urgent needs. For medical emergencies, dial 911. Now available from your iPhone and Android! Please provide this summary of care documentation to your next provider. Your primary care clinician is listed as Rolando King. If you have any questions after today's visit, please call [phone].

## 2018-07-19 LAB — BACTERIA UR CULT: ABNORMAL

## 2020-08-10 ENCOUNTER — OFFICE VISIT (OUTPATIENT)
Dept: NEUROLOGY | Age: 65
End: 2020-08-10
Payer: MEDICARE

## 2020-08-10 VITALS
DIASTOLIC BLOOD PRESSURE: 80 MMHG | HEART RATE: 73 BPM | HEIGHT: 75 IN | OXYGEN SATURATION: 97 % | WEIGHT: 202 LBS | SYSTOLIC BLOOD PRESSURE: 140 MMHG | BODY MASS INDEX: 25.12 KG/M2 | RESPIRATION RATE: 12 BRPM

## 2020-08-10 DIAGNOSIS — G25.0 BENIGN ESSENTIAL TREMOR SYNDROME: ICD-10-CM

## 2020-08-10 DIAGNOSIS — R53.1 WEAKNESS GENERALIZED: ICD-10-CM

## 2020-08-10 DIAGNOSIS — G20 PARKINSON'S DISEASE (TREMOR, STIFFNESS, SLOW MOTION, UNSTABLE POSTURE) (HCC): Primary | ICD-10-CM

## 2020-08-10 DIAGNOSIS — R25.1 TREMORS OF NERVOUS SYSTEM: ICD-10-CM

## 2020-08-10 PROCEDURE — 3017F COLORECTAL CA SCREEN DOC REV: CPT | Performed by: PSYCHIATRY & NEUROLOGY

## 2020-08-10 PROCEDURE — G8427 DOCREV CUR MEDS BY ELIG CLIN: HCPCS | Performed by: PSYCHIATRY & NEUROLOGY

## 2020-08-10 PROCEDURE — G8536 NO DOC ELDER MAL SCRN: HCPCS | Performed by: PSYCHIATRY & NEUROLOGY

## 2020-08-10 PROCEDURE — G8510 SCR DEP NEG, NO PLAN REQD: HCPCS | Performed by: PSYCHIATRY & NEUROLOGY

## 2020-08-10 PROCEDURE — G8419 CALC BMI OUT NRM PARAM NOF/U: HCPCS | Performed by: PSYCHIATRY & NEUROLOGY

## 2020-08-10 PROCEDURE — 1101F PT FALLS ASSESS-DOCD LE1/YR: CPT | Performed by: PSYCHIATRY & NEUROLOGY

## 2020-08-10 PROCEDURE — 99205 OFFICE O/P NEW HI 60 MIN: CPT | Performed by: PSYCHIATRY & NEUROLOGY

## 2020-08-10 RX ORDER — CARBIDOPA AND LEVODOPA 25; 100 MG/1; MG/1
1 TABLET ORAL 3 TIMES DAILY
Qty: 90 TAB | Refills: 11 | Status: SHIPPED | OUTPATIENT
Start: 2020-08-10 | End: 2020-12-08 | Stop reason: SDUPTHER

## 2020-08-10 RX ORDER — MULTIVIT WITH MINERALS/HERBS
1 TABLET ORAL DAILY
COMMUNITY
End: 2021-04-08 | Stop reason: ALTCHOICE

## 2020-08-10 RX ORDER — PRIMIDONE 50 MG/1
TABLET ORAL DAILY
COMMUNITY
End: 2021-04-08 | Stop reason: ALTCHOICE

## 2020-08-10 RX ORDER — ALLOPURINOL 100 MG/1
200 TABLET ORAL DAILY
COMMUNITY

## 2020-08-10 RX ORDER — GLUCOSAMINE SULFATE 1500 MG
POWDER IN PACKET (EA) ORAL DAILY
COMMUNITY

## 2020-08-10 RX ORDER — MELATONIN 5 MG
5-10 CAPSULE ORAL
COMMUNITY
End: 2021-04-08 | Stop reason: ALTCHOICE

## 2020-08-10 NOTE — PROGRESS NOTES
Consult  REFERRED BY:  Yanick Lo MD    CHIEF COMPLAINT: Loss of energy, muscle twitching in upper part of legs, sensitivity to cold, loss of libido, loss of muscle strength sleep interrupted after 4 hours of sleep, weight is diminished, stress intolerance because of tremors, shaking in both hands neck and arms correctly, shaking involving the head with positive sensation at the base of his neck sometimes pulsating across the abdomen and brought by loss of energy and depression with Mysoline      Subjective:     Marcia Tabares is a 76 y.o. right-handed  male seen as a new patient to me, at the request of Dr. Parrish Beyer for evaluation of multiple neurologic complaints as mentioned above there is been present for greater than 1 year and progressive and causing more difficulty. Patient's tremor is accentuated when he has to self catheterize himself, which he does 4 times a day, because of neurogenic bladder that occurred after he had abdominal abscess requiring surgery in 2016. The patient noted the tremors more when he tries to do things that responded to Mysoline he became depressed and now is on a half a tablet tolerating that better. He took Lexapro for a while because of depression. He has a family history of tremors in his paternal aunt [place]-tremors and and one brother also was chronically abusing alcohol. 1 maternal aunt had Parkinson's disease. He notices that his voice is softer, the tremor sometimes will occur when he is at rest worse in the right arm compared to the left arm, and he feels that he is moving slower and complains of generalized weakness. He tries exercise regularly, and does not have the endurance he used to. When he goes to bed he has to catheterize himself about 4 hours later he can never go back to bed. He speaks with a soft voice and may have smaller handwriting. Any thing that gets him upset makes his tremor worse.   He has had recent Z98 level and folic acid levels that were normal, but he does have mild anemia with macrocytosis. He had normal vitamin D levels recently, normal thyroid, normal metabolic screen except for some cholesterol problems. He is also had recent 8 pound weight loss even though he tries to eat regularly, he walks about 3 miles a day. He takes some vitamins on a regular basis. He has no unusual neck or thoracic pain or lumbar pain. His cranial nerves II through XII are otherwise intact has no difficulty speaking or swallowing other than the soft voice. He has had no falls or injuries and no other toxin exposure or neuroleptic exposure. Patient also had a normal carotid Doppler study just recently. He denies any history of stroke or TIA. Past Medical History:   Diagnosis Date    Arthritis     gouty arthritis in feet & ankles    Gout     HTN (hypertension)       Past Surgical History:   Procedure Laterality Date    COLONOSCOPY N/A 12/28/2016    COLONOSCOPY performed by Zenaida Marcus MD at [facility] AMBULATORY OR    HX COLONOSCOPY  2010    HX GI  12/29/2016    LAPAROSCOPIC SIGMOID COLECTOMY/ CYSTOSCOPY/ BILATERAL URETERAL STENT PLACEMENT AND REMOVAL    HX ORTHOPAEDIC      Lumbar surgery age 24    HX UROLOGICAL      bladder     Family History   Problem Relation Age of Onset    Other Mother         old age   [facility] Heart Disease Father     Diabetes Father     Heart Disease Brother     Alcohol abuse Brother     Other Brother         pancreatitis    Anxiety Child     Depression Child     Attention Deficit Disorder Child       Social History     Tobacco Use    Smoking status: Never Smoker    Smokeless tobacco: Never Used   Substance Use Topics    Alcohol use: No         Current Outpatient Medications:     primidone (MYSOLINE) 50 mg tablet, Take  by mouth daily. , Disp: , Rfl:     allopurinoL (ZYLOPRIM) 100 mg tablet, Take  by mouth daily. , Disp: , Rfl:     Boswellia alexander extract (BOSWELLIA ALEXANDER XT, BULK,), by Does Not Apply route., Disp: , Rfl:     cholecalciferol (VITAMIN D3) 25 mcg (1,000 unit) cap, Take  by mouth daily. , Disp: , Rfl:     b complex vitamins tablet, Take 1 Tab by mouth daily. , Disp: , Rfl:     melatonin 5 mg cap capsule, Take 5-10 mg by mouth nightly., Disp: , Rfl:     carbidopa-levodopa (Sinemet)  mg per tablet, Take 1 Tab by mouth three (3) times daily. Indications: a type of movement disorder called parkinsonism, restless legs syndrome, an extreme discomfort in the calf muscles when sitting or lying down, Disp: 90 Tab, Rfl: 11        No Known Allergies   MRI Results (most recent):  No results found for this or any previous visit. No results found for this or any previous visit. Review of Systems:  A comprehensive review of systems was negative except for: Constitutional: positive for fatigue, malaise and weight loss  Genitourinary: positive for urinary incontinence  Musculoskeletal: positive for myalgias, arthralgias, stiff joints and muscle weakness  Neurological: positive for gait problems, tremor and weakness  Behvioral/Psych: positive for anxiety and depression   Vitals:    08/10/20 1339   BP: 140/80   Pulse: 73   Resp: 12   SpO2: 97%   Weight: 202 lb (91.6 kg)   Height: 6' 3\" (1.905 m)     Objective:     I      NEUROLOGICAL EXAM:    Appearance: The patient is well developed, well nourished, provides a coherent history and is in no acute distress. Mental Status: Oriented to time, place and person, and the president, cognitive function is normal and speech is fluent and no aphasia or dysarthria. Mood and affect appropriate. Cranial Nerves:   Intact visual fields. Fundi are benign, disc are flat, no lesions seen on funduscopy. TARAS, EOM's full, no nystagmus, no ptosis. Facial sensation is normal. Corneal reflexes are not tested. Facial movement is symmetric. Hearing is normal bilaterally.  Palate is midline with normal sternocleidomastoid and trapezius muscles are normal. Tongue is midline. Neck without meningismus or bruits  Temporal arteries are not tender or enlarged  TMJ areas are not tender on palpation   Motor:  5/5 strength in upper and lower proximal and distal muscles. Normal bulk and tone throughout except slight increased muscle tone and rigidity and bradykinesia in both upper extremities with reinforcement. No fasciculations. Rapid alternating movement is symmetric and intact bilaterally   Reflexes:   Deep tendon reflexes 2+/4 and symmetrical.  No babinski or clonus present   Sensory:   Normal to touch, pinprick and vibration and temperature intact except slight decrease in both feet. DSS is intact   Gait:  Normal gait for patient's age, except that the patient may move a little slowly and has decreased arm swing on the right. .   Tremor:    Mild bilateral intention tremor noted in both upper extremities, and a mild to moderate somewhat fluctuating rest tremor and static tremor in the right upper extremity and mild involvement in the left upper extremity. Patient has mild micrographia and a slightly soft voice. .   Cerebellar:  No abnormal cerebellar signs present on Romberg and tandem testing and finger-nose-finger exam shows the bilateral intention tremor. Neurovascular:  Normal heart sounds and regular rhythm, peripheral pulses intact, and no carotid bruits. Assessment:       ICD-10-CM ICD-9-CM    1. Parkinson's disease (tremor, stiffness, slow motion, unstable posture) ([facility])  G20 332.0 carbidopa-levodopa (Sinemet)  mg per tablet      ANTINEURONAL CELL AB      PARIETAL CELL AB      CK      SED RATE (ESR)      CAMILLE COMPREHENSIVE PLUS PANEL      MRI BRAIN W WO CONT   2. Benign essential tremor syndrome  G25.0 333.1 carbidopa-levodopa (Sinemet)  mg per tablet      ANTINEURONAL CELL AB      PARIETAL CELL AB      CK      SED RATE (ESR)      CAMILLE COMPREHENSIVE PLUS PANEL      MRI BRAIN W WO CONT   3.  Weakness generalized  R53.1 780.79 carbidopa-levodopa (Sinemet)  mg per tablet      ANTINEURONAL CELL AB      PARIETAL CELL AB      CK      SED RATE (ESR)      CAMILLE COMPREHENSIVE PLUS PANEL      MRI BRAIN W WO CONT   4. Tremors of nervous system  R25.1 781.0 carbidopa-levodopa (Sinemet)  mg per tablet      ANTINEURONAL CELL AB      PARIETAL CELL AB      CK      SED RATE (ESR)      CAMILLE COMPREHENSIVE PLUS PANEL      MRI BRAIN W WO CONT     Active Problems:    * No active hospital problems. *      Plan:     New problem of patient having increased tremors in his arms, and seems to be more of a rest tremor bilaterally, right side slightly worse than the left, but present on both sides, and he has a masklike facies, decreased arm swing on the right arm when he is walking, some micrographia when signing his name, and mild cogwheeling and rigidity in both upper extremities with reinforcement all suggesting the patient has Parkinson's disease. To evaluate this we will get an MRI of the brain, check a few labs, and start him on Sinemet 25/100 mg 1/2 tablet 3 times a day then advance to 1 tablet 3 times a day if he tolerates half a tablet after 2 weeks. For his weakness we will check a CPK CAMILLE and sed rate to rule out neuromuscular disease, and an anti-neuronal antibody titer. He also has essential tremor that is mild in both upper extremities he is to continue with half a pill of Mysoline since the tremor of essential type really seems mild at this time. For his macrocytosis he will get an antiparietal cell antibody titer just to make sure he does not have pernicious anemia  For his chronic insomnia recommend he try melatonin 5 to 10 mg to see if that helps first before resorting to medications.   He is to eat a good diet, watch his weight and continue regular exercise program.  We advised to do a combination of cardiovascular strength strengthening exercise, and stretching and balance and flexibility  Patient may need an EMG to evaluate his weakness, we will await the above-mentioned test first.  Recommended a One-A-Day vitamin and vitamin D, and the patient is to continue a regular exercise program, which we advised him can slow the progression of the disease down. Patient will check my chart for results of his test, 1 hour for the patient going over his history, reviewing his labs, looking at his diagnosis and prognosis, and diagnostic testing needed and patient agrees with plans as outlined above. He will call if any problem in the interim otherwise we will see him in 3 months time. Signed By: Ni Andres MD     August 10, 2020       CC: Ashley Larose MD  FAX: [phone]    This note will not be viewable in [address]

## 2020-08-10 NOTE — LETTER
8/10/20 Patient: Carmen Farmer YOB: 1955 Date of Visit: 8/10/2020 Wily Chua MD 
[address] [place] [address] [address] [address] VIA Facsimile: [phone] Dear Wily Chua MD, Thank you for referring Mr. Jad Triplett to [facility] for evaluation. My notes for this consultation are attached. Consult REFERRED BY: 
Baldomero Edward MD 
 
CHIEF COMPLAINT: Loss of energy, muscle twitching in upper part of legs, sensitivity to cold, loss of libido, loss of muscle strength sleep interrupted after 4 hours of sleep, weight is diminished, stress intolerance because of tremors, shaking in both hands neck and arms correctly, shaking involving the head with positive sensation at the base of his neck sometimes pulsating across the abdomen and brought by loss of energy and depression with Mysoline Subjective:  
 
Carmen Farmer is a 76 y.o. right-handed  male seen as a new patient to me, at the request of Dr. Damaso Way for evaluation of multiple neurologic complaints as mentioned above there is been present for greater than 1 year and progressive and causing more difficulty. Patient's tremor is accentuated when he has to self catheterize himself, which he does 4 times a day, because of neurogenic bladder that occurred after he had abdominal abscess requiring surgery in 2016. The patient noted the tremors more when he tries to do things that responded to Mysoline he became depressed and now is on a half a tablet tolerating that better. He took Lexapro for a while because of depression. He has a family history of tremors in his paternal aunt [place]-tremors and and one brother also was chronically abusing alcohol. 1 maternal aunt had Parkinson's disease.   He notices that his voice is softer, the tremor sometimes will occur when he is at rest worse in the right arm compared to the left arm, and he feels that he is moving slower and complains of generalized weakness. He tries exercise regularly, and does not have the endurance he used to. When he goes to bed he has to catheterize himself about 4 hours later he can never go back to bed. He speaks with a soft voice and may have smaller handwriting. Any thing that gets him upset makes his tremor worse. He has had recent G48 level and folic acid levels that were normal, but he does have mild anemia with macrocytosis. He had normal vitamin D levels recently, normal thyroid, normal metabolic screen except for some cholesterol problems. He is also had recent 8 pound weight loss even though he tries to eat regularly, he walks about 3 miles a day. He takes some vitamins on a regular basis. He has no unusual neck or thoracic pain or lumbar pain. His cranial nerves II through XII are otherwise intact has no difficulty speaking or swallowing other than the soft voice. He has had no falls or injuries and no other toxin exposure or neuroleptic exposure. Patient also had a normal carotid Doppler study just recently. He denies any history of stroke or TIA. Past Medical History:  
Diagnosis Date  Arthritis   
 gouty arthritis in feet & ankles  Gout   
 HTN (hypertension) Past Surgical History:  
Procedure Laterality Date  COLONOSCOPY N/A 12/28/2016 COLONOSCOPY performed by Keon Huntley MD at . [facility] HX COLONOSCOPY  2010  HX GI  12/29/2016 LAPAROSCOPIC SIGMOID COLECTOMY/ CYSTOSCOPY/ BILATERAL URETERAL STENT PLACEMENT AND REMOVAL  
 HX ORTHOPAEDIC Lumbar surgery age 24  
 HX UROLOGICAL    
 bladder Family History Problem Relation Age of Onset Ashly Velasquez Other Mother   
     old age  Heart Disease Father  Diabetes Father  Heart Disease Brother  Alcohol abuse Brother  Other Brother   
     pancreatitis  Anxiety Child  Depression Child  Attention Deficit Disorder Child Social History Tobacco Use  Smoking status: Never Smoker  Smokeless tobacco: Never Used Substance Use Topics  Alcohol use: No  
   
 
Current Outpatient Medications:  
  primidone (MYSOLINE) 50 mg tablet, Take  by mouth daily. , Disp: , Rfl:  
  allopurinoL (ZYLOPRIM) 100 mg tablet, Take  by mouth daily. , Disp: , Rfl:  
  Boswellia alexander extract (BOSWELLIA ALEXANDER XT, BULK,), by Does Not Apply route., Disp: , Rfl:  
  cholecalciferol (VITAMIN D3) 25 mcg (1,000 unit) cap, Take  by mouth daily. , Disp: , Rfl:  
  b complex vitamins tablet, Take 1 Tab by mouth daily. , Disp: , Rfl:  
  melatonin 5 mg cap capsule, Take 5-10 mg by mouth nightly., Disp: , Rfl:  
  carbidopa-levodopa (Sinemet)  mg per tablet, Take 1 Tab by mouth three (3) times daily. Indications: a type of movement disorder called parkinsonism, restless legs syndrome, an extreme discomfort in the calf muscles when sitting or lying down, Disp: 90 Tab, Rfl: 11 No Known Allergies MRI Results (most recent): No results found for this or any previous visit. No results found for this or any previous visit. Review of Systems: A comprehensive review of systems was negative except for: Constitutional: positive for fatigue, malaise and weight loss Genitourinary: positive for urinary incontinence Musculoskeletal: positive for myalgias, arthralgias, stiff joints and muscle weakness Neurological: positive for gait problems, tremor and weakness Behvioral/Psych: positive for anxiety and depression Vitals:  
 08/10/20 1339 BP: 140/80 Pulse: 73 Resp: 12 SpO2: 97% Weight: 202 lb (91.6 kg) Height: 6' 3\" (1.905 m) Objective: I 
 
 
NEUROLOGICAL EXAM: 
 
Appearance: The patient is well developed, well nourished, provides a coherent history and is in no acute distress.   
Mental Status: Oriented to time, place and person, and the president, cognitive function is normal and speech is fluent and no aphasia or dysarthria. Mood and affect appropriate. Cranial Nerves:   Intact visual fields. Fundi are benign, disc are flat, no lesions seen on funduscopy. TARAS, EOM's full, no nystagmus, no ptosis. Facial sensation is normal. Corneal reflexes are not tested. Facial movement is symmetric. Hearing is normal bilaterally. Palate is midline with normal sternocleidomastoid and trapezius muscles are normal. Tongue is midline. Neck without meningismus or bruits Temporal arteries are not tender or enlarged TMJ areas are not tender on palpation Motor:  5/5 strength in upper and lower proximal and distal muscles. Normal bulk and tone throughout except slight increased muscle tone and rigidity and bradykinesia in both upper extremities with reinforcement. No fasciculations. Rapid alternating movement is symmetric and intact bilaterally Reflexes:   Deep tendon reflexes 2+/4 and symmetrical. 
No babinski or clonus present Sensory:   Normal to touch, pinprick and vibration and temperature intact except slight decrease in both feet. DSS is intact Gait:  Normal gait for patient's age, except that the patient may move a little slowly and has decreased arm swing on the right. Dikr Rook Tremor:    Mild bilateral intention tremor noted in both upper extremities, and a mild to moderate somewhat fluctuating rest tremor and static tremor in the right upper extremity and mild involvement in the left upper extremity. Patient has mild micrographia and a slightly soft voice. Dirk Rook Cerebellar:  No abnormal cerebellar signs present on Romberg and tandem testing and finger-nose-finger exam shows the bilateral intention tremor. Neurovascular:  Normal heart sounds and regular rhythm, peripheral pulses intact, and no carotid bruits. Assessment: ICD-10-CM ICD-9-CM 1. Parkinson's disease (tremor, stiffness, slow motion, unstable posture) ([facility])  G20 332.0 carbidopa-levodopa (Sinemet)  mg per tablet    ANTINEURONAL CELL AB  
 PARIETAL CELL AB  
   CK SED RATE (ESR) CAMILLE COMPREHENSIVE PLUS PANEL  
   MRI BRAIN W WO CONT 2. Benign essential tremor syndrome  G25.0 333.1 carbidopa-levodopa (Sinemet)  mg per tablet ANTINEURONAL CELL AB  
   PARIETAL CELL AB  
   CK SED RATE (ESR) CAMILLE COMPREHENSIVE PLUS PANEL  
   MRI BRAIN W WO CONT 3. Weakness generalized  R53.1 780.79 carbidopa-levodopa (Sinemet)  mg per tablet ANTINEURONAL CELL AB  
   PARIETAL CELL AB  
   CK SED RATE (ESR) CAMILLE COMPREHENSIVE PLUS PANEL  
   MRI BRAIN W WO CONT 4. Tremors of nervous system  R25.1 781.0 carbidopa-levodopa (Sinemet)  mg per tablet ANTINEURONAL CELL AB  
   PARIETAL CELL AB  
   CK SED RATE (ESR) CAMILLE COMPREHENSIVE PLUS PANEL  
   MRI BRAIN W WO CONT Active Problems: * No active hospital problems. * 
 
 
Plan:  
 
New problem of patient having increased tremors in his arms, and seems to be more of a rest tremor bilaterally, right side slightly worse than the left, but present on both sides, and he has a masklike facies, decreased arm swing on the right arm when he is walking, some micrographia when signing his name, and mild cogwheeling and rigidity in both upper extremities with reinforcement all suggesting the patient has Parkinson's disease. To evaluate this we will get an MRI of the brain, check a few labs, and start him on Sinemet 25/100 mg 1/2 tablet 3 times a day then advance to 1 tablet 3 times a day if he tolerates half a tablet after 2 weeks. For his weakness we will check a CPK CAMILLE and sed rate to rule out neuromuscular disease, and an anti-neuronal antibody titer. He also has essential tremor that is mild in both upper extremities he is to continue with half a pill of Mysoline since the tremor of essential type really seems mild at this time.  
For his macrocytosis he will get an antiparietal cell antibody titer just to make sure he does not have pernicious anemia For his chronic insomnia recommend he try melatonin 5 to 10 mg to see if that helps first before resorting to medications. He is to eat a good diet, watch his weight and continue regular exercise program.  We advised to do a combination of cardiovascular strength strengthening exercise, and stretching and balance and flexibility Patient may need an EMG to evaluate his weakness, we will await the above-mentioned test first. 
Recommended a One-A-Day vitamin and vitamin D, and the patient is to continue a regular exercise program, which we advised him can slow the progression of the disease down. Patient will check my chart for results of his test, 1 hour for the patient going over his history, reviewing his labs, looking at his diagnosis and prognosis, and diagnostic testing needed and patient agrees with plans as outlined above. He will call if any problem in the interim otherwise we will see him in 3 months time. Signed By: Ton Fernandez MD   
 August 10, 2020 CC: Ning Webb MD 
FAX: [phone] This note will not be viewable in [address] If you have questions, please do not hesitate to call me. I look forward to following your patient along with you. Sincerely, Ton Fernandez MD

## 2020-08-10 NOTE — PATIENT INSTRUCTIONS

## 2020-08-17 DIAGNOSIS — R53.1 WEAKNESS GENERALIZED: ICD-10-CM

## 2020-08-17 DIAGNOSIS — G20 PARKINSON'S DISEASE (TREMOR, STIFFNESS, SLOW MOTION, UNSTABLE POSTURE) (HCC): ICD-10-CM

## 2020-08-17 DIAGNOSIS — R25.1 TREMORS OF NERVOUS SYSTEM: ICD-10-CM

## 2020-08-17 DIAGNOSIS — G25.0 BENIGN ESSENTIAL TREMOR SYNDROME: ICD-10-CM

## 2020-08-22 LAB
ANTI NEURONAL CELL AB METHOD: NORMAL
CENTROMERE B AB SER-ACNC: <0.2 AI (ref 0–0.9)
CHROMATIN AB SERPL-ACNC: <0.2 AI (ref 0–0.9)
CK SERPL-CCNC: 57 U/L (ref 41–331)
DSDNA AB SER-ACNC: 7 IU/ML (ref 0–9)
ENA JO1 AB SER-ACNC: <0.2 AI (ref 0–0.9)
ENA RNP AB SER-ACNC: <0.2 AI (ref 0–0.9)
ENA SCL70 AB SER-ACNC: <0.2 AI (ref 0–0.9)
ENA SM AB SER-ACNC: <0.2 AI (ref 0–0.9)
ENA SM+RNP AB SER-ACNC: <0.2 AI (ref 0–0.9)
ENA SS-A AB SER-ACNC: <0.2 AI (ref 0–0.9)
ENA SS-B AB SER-ACNC: <0.2 AI (ref 0–0.9)
ERYTHROCYTE [SEDIMENTATION RATE] IN BLOOD BY WESTERGREN METHOD: 2 MM/HR (ref 0–30)
INTERPRETATION, 811987: NORMAL
NEURONAL AB SER IA-ACNC: 5 UNITS (ref 0–54)
PCA AB SER-ACNC: 1.9 UNITS (ref 0–20)
RIBOSOMAL P AB SER-ACNC: <0.2 AI (ref 0–0.9)
SEE BELOW:, 164879: NORMAL

## 2020-08-24 ENCOUNTER — HOSPITAL ENCOUNTER (OUTPATIENT)
Dept: MRI IMAGING | Age: 65
Discharge: HOME | End: 2020-08-24
Attending: PSYCHIATRY & NEUROLOGY
Payer: MEDICARE

## 2020-08-24 PROCEDURE — 70553 MRI BRAIN STEM W/O & W/DYE: CPT

## 2020-08-24 PROCEDURE — 74011250636 HC RX REV CODE- 250/636: Performed by: PSYCHIATRY & NEUROLOGY

## 2020-08-24 PROCEDURE — A9575 INJ GADOTERATE MEGLUMI 0.1ML: HCPCS | Performed by: PSYCHIATRY & NEUROLOGY

## 2020-08-24 RX ORDER — GADOTERATE MEGLUMINE 376.9 MG/ML
20 INJECTION INTRAVENOUS
Status: COMPLETED | OUTPATIENT
Start: 2020-08-24 | End: 2020-08-24

## 2020-08-24 RX ADMIN — GADOTERATE MEGLUMINE 20 ML: 376.9 INJECTION INTRAVENOUS at 12:17

## 2020-12-08 ENCOUNTER — VIRTUAL VISIT (OUTPATIENT)
Dept: NEUROLOGY | Age: 65
End: 2020-12-08
Payer: MEDICARE

## 2020-12-08 DIAGNOSIS — R53.1 WEAKNESS GENERALIZED: ICD-10-CM

## 2020-12-08 DIAGNOSIS — R25.1 TREMORS OF NERVOUS SYSTEM: ICD-10-CM

## 2020-12-08 DIAGNOSIS — G25.0 BENIGN ESSENTIAL TREMOR SYNDROME: ICD-10-CM

## 2020-12-08 DIAGNOSIS — G20 PARKINSON'S DISEASE (TREMOR, STIFFNESS, SLOW MOTION, UNSTABLE POSTURE) (HCC): Primary | ICD-10-CM

## 2020-12-08 PROCEDURE — 3017F COLORECTAL CA SCREEN DOC REV: CPT | Performed by: PSYCHIATRY & NEUROLOGY

## 2020-12-08 PROCEDURE — G8427 DOCREV CUR MEDS BY ELIG CLIN: HCPCS | Performed by: PSYCHIATRY & NEUROLOGY

## 2020-12-08 PROCEDURE — 1101F PT FALLS ASSESS-DOCD LE1/YR: CPT | Performed by: PSYCHIATRY & NEUROLOGY

## 2020-12-08 PROCEDURE — 99214 OFFICE O/P EST MOD 30 MIN: CPT | Performed by: PSYCHIATRY & NEUROLOGY

## 2020-12-08 PROCEDURE — G8432 DEP SCR NOT DOC, RNG: HCPCS | Performed by: PSYCHIATRY & NEUROLOGY

## 2020-12-08 PROCEDURE — G8536 NO DOC ELDER MAL SCRN: HCPCS | Performed by: PSYCHIATRY & NEUROLOGY

## 2020-12-08 PROCEDURE — G8419 CALC BMI OUT NRM PARAM NOF/U: HCPCS | Performed by: PSYCHIATRY & NEUROLOGY

## 2020-12-08 RX ORDER — RASAGILINE 1 MG/1
TABLET ORAL
Qty: 30 TAB | Refills: 11 | Status: SHIPPED | OUTPATIENT
Start: 2020-12-08 | End: 2020-12-15 | Stop reason: ALTCHOICE

## 2020-12-08 RX ORDER — CARBIDOPA AND LEVODOPA 25; 100 MG/1; MG/1
1 TABLET ORAL 4 TIMES DAILY
Qty: 90 TAB | Refills: 11 | Status: SHIPPED | OUTPATIENT
Start: 2020-12-08 | End: 2021-04-08 | Stop reason: SDUPTHER

## 2020-12-08 NOTE — LETTER
12/8/2020 9:18 PM    Patient:  Darin Jacobson   YOB: 1955  Date of Visit: 12/8/2020      Dear No Recipients: Thank you for referring Mr. Mariel Heck to me for evaluation/treatment. Below are the relevant portions of my assessment and plan of care. Consult  REFERRED BY:  Phillip Blanton MD    CHIEF COMPLAINT: Loss of energy, muscle twitching in upper part of legs, sensitivity to cold, loss of libido, loss of muscle strength sleep interrupted after 4 hours of sleep, weight is diminished, stress intolerance because of tremors, shaking in both hands neck and arms correctly, shaking involving the head with positive sensation at the base of his neck sometimes pulsating across the abdomen and brought by loss of energy and depression with Mysoline      Subjective:     Darin Jacobson is a 76 y.o. right-handed  male seen at the request of Dr. Jessenia Varghese for evaluation of multiple neurologic complaints as mentioned above there is been present for greater than 1 year and progressive and causing more difficulty and to see how he is doing on his medication of Sinemet 25/100 mg 3 times a day, and the patient initially was not sure it was helping much, and stopped the medication and got much worse after a month or so, went back on the medication and is now afraid to stop it because it does seem to help some he thinks, but his wife is definitely sure it does. He sometimes takes the Sinemet 4 times a day has a sensation wearing off phenomena, so we will start him on Azilect and work up to 1 mg each morning, and he is to increase his Sinemet to 4 times a day on a regular basis if tolerated. He is exercising regularly, and complained of shoulder pain and restricted motion probably from a frozen shoulder, but is getting better with his home exercise program.  When he ambulates today on exam, he does very well and does not seem to need to much more increase in medication yet.   Patient's tremor is accentuated when he has to self catheterize himself, which he does 4 times a day, because of neurogenic bladder that occurred after he had abdominal abscess requiring surgery in 2016, and also whenever he gets excited or upset or nervous, refill this was normal.  The patient noted the tremors more when he tries to do things that responded to Mysoline he became depressed and now is on a half a tablet tolerating that better. He took Lexapro for a while because of depression. He has a family history of tremors in his paternal aunt more essential tremors and and one brother also was chronically abusing alcohol. 1 maternal aunt had Parkinson's disease. He notices that his voice is softer, the tremor sometimes will occur when he is at rest worse in the right arm compared to the left arm, and he feels that he is moving slower and complains of generalized weakness. He tries exercise regularly, and does not have the endurance he used to. When he goes to bed he has to catheterize himself about 4 hours later he can never go back to bed. He speaks with a soft voice and may have smaller handwriting. Any thing that gets him upset makes his tremor worse. He has had recent Q17 level and folic acid levels that were normal, but he does have mild anemia with macrocytosis. He had normal vitamin D levels recently, normal thyroid, normal metabolic screen except for some cholesterol problems. He is also had recent 8 pound weight loss even though he tries to eat regularly, he walks about 3 miles a day. He takes some vitamins on a regular basis. He has no unusual neck or thoracic pain or lumbar pain. His cranial nerves II through XII are otherwise intact has no difficulty speaking or swallowing other than the soft voice. He has had no falls or injuries and no other toxin exposure or neuroleptic exposure. Patient also had a normal carotid Doppler study just recently. He denies any history of stroke or TIA.     Past Medical History:   Diagnosis Date    Arthritis     gouty arthritis in feet & ankles    Gout     HTN (hypertension)       Past Surgical History:   Procedure Laterality Date    COLONOSCOPY N/A 12/28/2016    COLONOSCOPY performed by Jaclyn Sharif MD at [facility] AM[facility]UL[facility] OR    HX COLONOSCOPY  2010    HX GI  12/29/2016    LAPAROSCOPIC SIGMOID COLECTOMY/ CYSTOSCOPY/ BILATERAL URETERAL STENT PLACEMENT AND REMOVAL    HX ORTHOPAEDIC      Lumbar surgery age 24    HX UROLOGICAL      bladder     Family History   Problem Relation Age of Onset    Other Mother         old age   [facility] Junior Heart Disease Father     Diabetes Father     Heart Disease Brother     Alcohol abuse Brother     Other Brother         pancreatitis    Anxiety Child     Depression Child     Attention Deficit Disorder Child       Social History     Tobacco Use    Smoking status: Never Smoker    Smokeless tobacco: Never Used   Substance Use Topics    Alcohol use: No         Current Outpatient Medications:     carbidopa-levodopa (Sinemet)  mg per tablet, Take 1 Tab by mouth four (4) times daily. Indications: a type of movement disorder called parkinsonism, restless legs syndrome, an extreme discomfort in the calf muscles when sitting or lying down, Disp: 90 Tab, Rfl: 11    rasagiline (Azilect) 1 mg tablet, 1/2 pill p.o. every morning for 2 weeks, then 1 pill p.o. every morning thereafter, Disp: 30 Tab, Rfl: 11    primidone (MYSOLINE) 50 mg tablet, Take  by mouth daily. , Disp: , Rfl:     allopurinoL (ZYLOPRIM) 100 mg tablet, Take  by mouth daily. , Disp: , Rfl:     Boswellia alexander extract (BOSWELLIA ALEXANDER XT, BULK,), by Does Not Apply route., Disp: , Rfl:     cholecalciferol (VITAMIN D3) 25 mcg (1,000 unit) cap, Take  by mouth daily. , Disp: , Rfl:     b complex vitamins tablet, Take 1 Tab by mouth daily. , Disp: , Rfl:     melatonin 5 mg cap capsule, Take 5-10 mg by mouth nightly., Disp: , Rfl:         No Known Allergies   MRI Results (most recent):  Results from Orders Only encounter on 08/17/20   MRI BRAIN W WO CONT    Narrative EXAM:  MRI BRAIN W WO CONT    INDICATION:    tremors    COMPARISON:  None. CONTRAST: 20 ml Dotarem. TECHNIQUE:    Multiplanar multisequence acquisition without and with contrast of the brain. FINDINGS:  Stat diffusion imaging does not show acute ischemic changes. Moderate atrophy, mild nonspecific white matter changes. No extra-axial fluid collection, hemorrhage or shift. Flow voids in major vessels at the base of the brain are present. No enhancing lesion no masses. Impression IMPRESSION: Atrophy and white matter disease. No acute findings no mass. Results from Orders Only encounter on 08/17/20   MRI BRAIN W WO CONT    Narrative EXAM:  MRI BRAIN W WO CONT    INDICATION:    tremors    COMPARISON:  None. CONTRAST: 20 ml Dotarem. TECHNIQUE:    Multiplanar multisequence acquisition without and with contrast of the brain. FINDINGS:  Stat diffusion imaging does not show acute ischemic changes. Moderate atrophy, mild nonspecific white matter changes. No extra-axial fluid collection, hemorrhage or shift. Flow voids in major vessels at the base of the brain are present. No enhancing lesion no masses. Impression IMPRESSION: Atrophy and white matter disease. No acute findings no mass. Review of Systems:  A comprehensive review of systems was negative except for: Constitutional: positive for fatigue, malaise and weight loss  Genitourinary: positive for urinary incontinence  Musculoskeletal: positive for myalgias, arthralgias, stiff joints and muscle weakness  Neurological: positive for gait problems, tremor and weakness  Behvioral/Psych: positive for anxiety and depression   There were no vitals filed for this visit. Objective:     I      NEUROLOGICAL EXAM:    Appearance: The patient is well developed, well nourished, provides a coherent history and is in no acute distress.    Mental Status: Oriented to time, place and person, and the president, cognitive function is normal and speech is fluent and no aphasia or dysarthria. Mood and affect appropriate. Cranial Nerves:   Intact visual fields. Fundi are benign, disc are flat, no lesions seen on funduscopy at last visit but not testable this visit. TARAS, EOM's full, no nystagmus, no ptosis. Facial sensation is normal. Corneal reflexes are not tested. Facial movement is symmetric. Hearing is normal bilaterally. Palate is midline with normal sternocleidomastoid and trapezius muscles are normal. Tongue is midline. Neck without meningismus or bruits  Temporal arteries are not tender or enlarged  TMJ areas are not tender on palpation   Motor:  5/5 strength in upper and lower proximal and distal muscles. Normal bulk and tone throughout except slight increased muscle tone and rigidity and bradykinesia in both upper extremities with reinforcement. No fasciculations. Rapid alternating movement is symmetric and intact bilaterally   Reflexes:   Deep tendon reflexes 2+/4 and symmetrical and no babinski or clonus present at last visit but not testable this visit   Sensory:   Normal to touch, pinprick and vibration and temperature intact except slight decrease in both feet and DSS is intact at last visit but not testable this visit   Gait:  Normal gait for patient's age, except that the patient may move a little slowly and has decreased arm swing on the right. .   Tremor:    Mild bilateral intention tremor noted in both upper extremities, and a mild to moderate somewhat fluctuating rest tremor and static tremor in the right upper extremity and mild involvement in the left upper extremity. Patient has mild micrographia and a slightly soft voice. .   Cerebellar:  No abnormal cerebellar signs present on Romberg and tandem testing and finger-nose-finger exam shows the bilateral intention tremor.    Neurovascular:  Normal heart sounds and regular rhythm, peripheral pulses intact, and no carotid bruits at last visit but not testable this visit. Assessment:       ICD-10-CM ICD-9-CM    1. Parkinson's disease (tremor, stiffness, slow motion, unstable posture) ([facility])  G20 332.0 carbidopa-levodopa (Sinemet)  mg per tablet      rasagiline (Azilect) 1 mg tablet   2. Tremors of nervous system  R25.1 781.0 carbidopa-levodopa (Sinemet)  mg per tablet      rasagiline (Azilect) 1 mg tablet   3. Benign essential tremor syndrome  G25.0 333.1 carbidopa-levodopa (Sinemet)  mg per tablet      rasagiline (Azilect) 1 mg tablet   4. Weakness generalized  R53.1 780.79 carbidopa-levodopa (Sinemet)  mg per tablet      rasagiline (Azilect) 1 mg tablet     Active Problems:    * No active hospital problems. *      Plan:     Patient with new problem of wearing off phenomenon, and freezing episodes, so we will increase his medication to 4 times a day of the Sinemet, and add on Azilect and the risk and benefits of all this explained to the patient and his wife in detail. Patient initially seen for problem of patient having increased tremors in his arms, and seems to be more of a rest tremor bilaterally, right side slightly worse than the left, but present on both sides, and he has a masklike facies, decreased arm swing on the right arm when he is walking, some micrographia when signing his name, and mild cogwheeling and rigidity in both upper extremities with reinforcement all suggesting the patient has Parkinson's disease. Patient had an MRI scan of the brain and thyroid test and B12 levels that were unremarkable except for age-related changes. For his weakness we checked a CPK CAMILLE and sed rate to rule out neuromuscular disease, and an anti-neuronal antibody titer. He also has essential tremor that is mild in both upper extremities he is to continue with half a pill of Mysoline since the tremor of essential type really seems mild at this time.   For his macrocytosis he will get an antiparietal cell antibody titer just to make sure he does not have pernicious anemia  For his chronic insomnia recommend he try melatonin 5 to 10 mg to see if that helps first before resorting to medications. He is to eat a good diet, watch his weight and continue regular exercise program.  We advised to do a combination of cardiovascular strength strengthening exercise, and stretching and balance and flexibility  Patient may need an EMG to evaluate his weakness, we will await the above-mentioned test first.  Recommended a One-A-Day vitamin and vitamin D, and the patient is to continue a regular exercise program, which we advised him can slow the progression of the disease down. Patient will check my chart for results of his test, 1 hour for the patient going over his history, reviewing his labs, looking at his diagnosis and prognosis, and diagnostic testing needed and patient agrees with plans as outlined above. He will call if any problem in the interim otherwise we will see him in 3 months time. Signed By: Jessica Lopez MD     December 8, 2020       CC: Dee Dee Mahajan MD  FAX: [phone] [facility] was seen by synchronous (real-time) audio-video technology on 12/08/20. Consent:  He and/or his healthcare decision maker is aware that this patient-initiated Telehealth encounter is a billable service, with coverage as determined by his insurance carrier. He is aware that he may receive a bill and has provided verbal consent to proceed: Yes    I was in the office while conducting this encounter.     Pursuant to the emergency declaration under the [facility], 1135 waiver authority and the [email or web address] and [employer] Act, this Virtual  Visit was conducted, with patient's consent, to reduce the patient's risk of exposure to COVID-19 and provide continuity of care for an established patient. Services were provided through a video synchronous discussion virtually to substitute for in-person clinic visit. If you have questions, please do not hesitate to call me. I look forward to following Mr. Loly Bryan along with you.         Sincerely,      Harpreet Geiger MD

## 2020-12-08 NOTE — PATIENT INSTRUCTIONS
Office Policies    o Phone calls/patient messages:  Please allow up to 24 hours for someone in the office to contact you about your call or message. Be mindful your provider may be out of the office or your message may require further review. We encourage you to use [employer] for your messages as this is a faster, more efficient way to communicate with our office    o Medication Refills:  Prescription medications require up to 48 business hours to process. We encourage you to use [employer] for your refills. For controlled medications: Please allow up to 72 business hours to process. Certain medications may require you to  a written prescription at our office. NO narcotic/controlled medications will be prescribed after 4pm Monday through Friday or on weekends    o Form/Paperwork Completion:  We ask that you allow 7-14 business days. You may also download your forms to [employer] to have your doctor print off.     Due to COVID-19, please note there may be a longer wait time than usual. Thank you

## 2020-12-09 NOTE — PROGRESS NOTES
Consult  REFERRED BY:  Johnny Redman MD    CHIEF COMPLAINT: Loss of energy, muscle twitching in upper part of legs, sensitivity to cold, loss of libido, loss of muscle strength sleep interrupted after 4 hours of sleep, weight is diminished, stress intolerance because of tremors, shaking in both hands neck and arms correctly, shaking involving the head with positive sensation at the base of his neck sometimes pulsating across the abdomen and brought by loss of energy and depression with Mysoline      Subjective:     Alexia Horta is a 76 y.o. right-handed  male seen at the request of Dr. Ruthie Perdomo for evaluation of multiple neurologic complaints as mentioned above there is been present for greater than 1 year and progressive and causing more difficulty and to see how he is doing on his medication of Sinemet 25/100 mg 3 times a day, and the patient initially was not sure it was helping much, and stopped the medication and got much worse after a month or so, went back on the medication and is now afraid to stop it because it does seem to help some he thinks, but his wife is definitely sure it does. He sometimes takes the Sinemet 4 times a day has a sensation wearing off phenomena, so we will start him on Azilect and work up to 1 mg each morning, and he is to increase his Sinemet to 4 times a day on a regular basis if tolerated. He is exercising regularly, and complained of shoulder pain and restricted motion probably from a frozen shoulder, but is getting better with his home exercise program.  When he ambulates today on exam, he does very well and does not seem to need to much more increase in medication yet.   Patient's tremor is accentuated when he has to self catheterize himself, which he does 4 times a day, because of neurogenic bladder that occurred after he had abdominal abscess requiring surgery in 2016, and also whenever he gets excited or upset or nervous, refill this was normal.  The patient noted the tremors more when he tries to do things that responded to Mysoline he became depressed and now is on a half a tablet tolerating that better. He took Lexapro for a while because of depression. He has a family history of tremors in his paternal aunt more essential tremors and and one brother also was chronically abusing alcohol. 1 maternal aunt had Parkinson's disease. He notices that his voice is softer, the tremor sometimes will occur when he is at rest worse in the right arm compared to the left arm, and he feels that he is moving slower and complains of generalized weakness. He tries exercise regularly, and does not have the endurance he used to. When he goes to bed he has to catheterize himself about 4 hours later he can never go back to bed. He speaks with a soft voice and may have smaller handwriting. Any thing that gets him upset makes his tremor worse. He has had recent E44 level and folic acid levels that were normal, but he does have mild anemia with macrocytosis. He had normal vitamin D levels recently, normal thyroid, normal metabolic screen except for some cholesterol problems. He is also had recent 8 pound weight loss even though he tries to eat regularly, he walks about 3 miles a day. He takes some vitamins on a regular basis. He has no unusual neck or thoracic pain or lumbar pain. His cranial nerves II through XII are otherwise intact has no difficulty speaking or swallowing other than the soft voice. He has had no falls or injuries and no other toxin exposure or neuroleptic exposure. Patient also had a normal carotid Doppler study just recently. He denies any history of stroke or TIA.     Past Medical History:   Diagnosis Date    Arthritis     gouty arthritis in feet & ankles    Gout     HTN (hypertension)       Past Surgical History:   Procedure Laterality Date    COLONOSCOPY N/A 12/28/2016    COLONOSCOPY performed by Sumaya Alvarado MD at [address] [place] [address] HX COLONOSCOPY  2010    HX GI  12/29/2016    LAPAROSCOPIC SIGMOID COLECTOMY/ CYSTOSCOPY/ BILATERAL URETERAL STENT PLACEMENT AND REMOVAL    HX ORTHOPAEDIC      Lumbar surgery age 24    HX UROLOGICAL      bladder     Family History   Problem Relation Age of Onset    Other Mother         old age   [facility] Heart Disease Father     Diabetes Father     Heart Disease Brother     Alcohol abuse Brother     Other Brother         pancreatitis    Anxiety Child     Depression Child     Attention Deficit Disorder Child       Social History     Tobacco Use    Smoking status: Never Smoker    Smokeless tobacco: Never Used   Substance Use Topics    Alcohol use: No         Current Outpatient Medications:     carbidopa-levodopa (Sinemet)  mg per tablet, Take 1 Tab by mouth four (4) times daily. Indications: a type of movement disorder called parkinsonism, restless legs syndrome, an extreme discomfort in the calf muscles when sitting or lying down, Disp: 90 Tab, Rfl: 11    rasagiline (Azilect) 1 mg tablet, 1/2 pill p.o. every morning for 2 weeks, then 1 pill p.o. every morning thereafter, Disp: 30 Tab, Rfl: 11    primidone (MYSOLINE) 50 mg tablet, Take  by mouth daily. , Disp: , Rfl:     allopurinoL (ZYLOPRIM) 100 mg tablet, Take  by mouth daily. , Disp: , Rfl:     Boswellia alexander extract (BOSWELLIA ALEXANDER XT, BULK,), by Does Not Apply route., Disp: , Rfl:     cholecalciferol (VITAMIN D3) 25 mcg (1,000 unit) cap, Take  by mouth daily. , Disp: , Rfl:     b complex vitamins tablet, Take 1 Tab by mouth daily. , Disp: , Rfl:     melatonin 5 mg cap capsule, Take 5-10 mg by mouth nightly., Disp: , Rfl:         No Known Allergies   MRI Results (most recent):  Results from Orders Only encounter on 08/17/20   MRI BRAIN W WO CONT    Narrative EXAM:  MRI BRAIN W WO CONT    INDICATION:    tremors    COMPARISON:  None. CONTRAST: 20 ml Dotarem.     TECHNIQUE:    Multiplanar multisequence acquisition without and with contrast of the brain. FINDINGS:  Stat diffusion imaging does not show acute ischemic changes. Moderate atrophy, mild nonspecific white matter changes. No extra-axial fluid collection, hemorrhage or shift. Flow voids in major vessels at the base of the brain are present. No enhancing lesion no masses. Impression IMPRESSION: Atrophy and white matter disease. No acute findings no mass. Results from Orders Only encounter on 08/17/20   MRI BRAIN W WO CONT    Narrative EXAM:  MRI BRAIN W WO CONT    INDICATION:    tremors    COMPARISON:  None. CONTRAST: 20 ml Dotarem. TECHNIQUE:    Multiplanar multisequence acquisition without and with contrast of the brain. FINDINGS:  Stat diffusion imaging does not show acute ischemic changes. Moderate atrophy, mild nonspecific white matter changes. No extra-axial fluid collection, hemorrhage or shift. Flow voids in major vessels at the base of the brain are present. No enhancing lesion no masses. Impression IMPRESSION: Atrophy and white matter disease. No acute findings no mass. Review of Systems:  A comprehensive review of systems was negative except for: Constitutional: positive for fatigue, malaise and weight loss  Genitourinary: positive for urinary incontinence  Musculoskeletal: positive for myalgias, arthralgias, stiff joints and muscle weakness  Neurological: positive for gait problems, tremor and weakness  Behvioral/Psych: positive for anxiety and depression   There were no vitals filed for this visit. Objective:     I      NEUROLOGICAL EXAM:    Appearance: The patient is well developed, well nourished, provides a coherent history and is in no acute distress. Mental Status: Oriented to time, place and person, and the president, cognitive function is normal and speech is fluent and no aphasia or dysarthria. Mood and affect appropriate. Cranial Nerves:   Intact visual fields.  Fundi are benign, disc are flat, no lesions seen on funduscopy at last visit but not testable this visit. TARAS, EOM's full, no nystagmus, no ptosis. Facial sensation is normal. Corneal reflexes are not tested. Facial movement is symmetric. Hearing is normal bilaterally. Palate is midline with normal sternocleidomastoid and trapezius muscles are normal. Tongue is midline. Neck without meningismus or bruits  Temporal arteries are not tender or enlarged  TMJ areas are not tender on palpation   Motor:  5/5 strength in upper and lower proximal and distal muscles. Normal bulk and tone throughout except slight increased muscle tone and rigidity and bradykinesia in both upper extremities with reinforcement. No fasciculations. Rapid alternating movement is symmetric and intact bilaterally   Reflexes:   Deep tendon reflexes 2+/4 and symmetrical and no babinski or clonus present at last visit but not testable this visit   Sensory:   Normal to touch, pinprick and vibration and temperature intact except slight decrease in both feet and DSS is intact at last visit but not testable this visit   Gait:  Normal gait for patient's age, except that the patient may move a little slowly and has decreased arm swing on the right. .   Tremor:    Mild bilateral intention tremor noted in both upper extremities, and a mild to moderate somewhat fluctuating rest tremor and static tremor in the right upper extremity and mild involvement in the left upper extremity. Patient has mild micrographia and a slightly soft voice. .   Cerebellar:  No abnormal cerebellar signs present on Romberg and tandem testing and finger-nose-finger exam shows the bilateral intention tremor. Neurovascular:  Normal heart sounds and regular rhythm, peripheral pulses intact, and no carotid bruits at last visit but not testable this visit. Assessment:       ICD-10-CM ICD-9-CM    1.  Parkinson's disease (tremor, stiffness, slow motion, unstable posture) ([facility])  G20 332.0 carbidopa-levodopa (Sinemet)  mg per tablet rasagiline (Azilect) 1 mg tablet   2. Tremors of nervous system  R25.1 781.0 carbidopa-levodopa (Sinemet)  mg per tablet      rasagiline (Azilect) 1 mg tablet   3. Benign essential tremor syndrome  G25.0 333.1 carbidopa-levodopa (Sinemet)  mg per tablet      rasagiline (Azilect) 1 mg tablet   4. Weakness generalized  R53.1 780.79 carbidopa-levodopa (Sinemet)  mg per tablet      rasagiline (Azilect) 1 mg tablet     Active Problems:    * No active hospital problems. *      Plan:     Patient with new problem of wearing off phenomenon, and freezing episodes, so we will increase his medication to 4 times a day of the Sinemet, and add on Azilect and the risk and benefits of all this explained to the patient and his wife in detail. Patient initially seen for problem of patient having increased tremors in his arms, and seems to be more of a rest tremor bilaterally, right side slightly worse than the left, but present on both sides, and he has a masklike facies, decreased arm swing on the right arm when he is walking, some micrographia when signing his name, and mild cogwheeling and rigidity in both upper extremities with reinforcement all suggesting the patient has Parkinson's disease. Patient had an MRI scan of the brain and thyroid test and B12 levels that were unremarkable except for age-related changes. For his weakness we checked a CPK CAMILLE and sed rate to rule out neuromuscular disease, and an anti-neuronal antibody titer. He also has essential tremor that is mild in both upper extremities he is to continue with half a pill of Mysoline since the tremor of essential type really seems mild at this time. For his macrocytosis he will get an antiparietal cell antibody titer just to make sure he does not have pernicious anemia  For his chronic insomnia recommend he try melatonin 5 to 10 mg to see if that helps first before resorting to medications.   He is to eat a good diet, watch his weight and continue regular exercise program.  We advised to do a combination of cardiovascular strength strengthening exercise, and stretching and balance and flexibility  Patient may need an EMG to evaluate his weakness, we will await the above-mentioned test first.  Recommended a One-A-Day vitamin and vitamin D, and the patient is to continue a regular exercise program, which we advised him can slow the progression of the disease down. Patient will check my chart for results of his test, 1 hour for the patient going over his history, reviewing his labs, looking at his diagnosis and prognosis, and diagnostic testing needed and patient agrees with plans as outlined above. He will call if any problem in the interim otherwise we will see him in 3 months time. Signed By: Jing Petit MD     December 8, 2020       CC: Kortney Evans MD  FAX: [phone] was seen by synchronous (real-time) audio-video technology on 12/08/20. Consent:  He and/or his healthcare decision maker is aware that this patient-initiated Telehealth encounter is a billable service, with coverage as determined by his insurance carrier. He is aware that he may receive a bill and has provided verbal consent to proceed: Yes    I was in the office while conducting this encounter. Pursuant to the emergency declaration under the [facility]5 waiver authority and the [employer] General Act, this Virtual  Visit was conducted, with patient's consent, to reduce the patient's risk of exposure to COVID-19 and provide continuity of care for an established patient. Services were provided through a video synchronous discussion virtually to substitute for in-person clinic visit.

## 2020-12-10 ENCOUNTER — TELEPHONE (OUTPATIENT)
Dept: NEUROLOGY | Age: 65
End: 2020-12-10

## 2020-12-15 DIAGNOSIS — G20 PARKINSON'S DISEASE (TREMOR, STIFFNESS, SLOW MOTION, UNSTABLE POSTURE) (HCC): Primary | ICD-10-CM

## 2020-12-15 RX ORDER — SELEGILINE HYDROCHLORIDE 5 MG/1
5 TABLET ORAL
Qty: 60 TAB | Refills: 11 | Status: SHIPPED | OUTPATIENT
Start: 2020-12-15 | End: 2021-04-08 | Stop reason: ALTCHOICE

## 2021-04-08 ENCOUNTER — OFFICE VISIT (OUTPATIENT)
Dept: NEUROLOGY | Age: 66
End: 2021-04-08
Payer: MEDICARE

## 2021-04-08 VITALS
HEART RATE: 59 BPM | DIASTOLIC BLOOD PRESSURE: 86 MMHG | HEIGHT: 75 IN | SYSTOLIC BLOOD PRESSURE: 152 MMHG | OXYGEN SATURATION: 99 % | BODY MASS INDEX: 26.11 KG/M2 | WEIGHT: 210 LBS

## 2021-04-08 DIAGNOSIS — M47.22 CERVICAL RADICULOPATHY DUE TO DEGENERATIVE JOINT DISEASE OF SPINE: ICD-10-CM

## 2021-04-08 DIAGNOSIS — R25.1 TREMORS OF NERVOUS SYSTEM: ICD-10-CM

## 2021-04-08 DIAGNOSIS — R53.1 WEAKNESS GENERALIZED: ICD-10-CM

## 2021-04-08 DIAGNOSIS — G25.0 BENIGN ESSENTIAL TREMOR SYNDROME: Primary | ICD-10-CM

## 2021-04-08 DIAGNOSIS — G20 PARKINSON'S DISEASE (TREMOR, STIFFNESS, SLOW MOTION, UNSTABLE POSTURE) (HCC): ICD-10-CM

## 2021-04-08 PROCEDURE — G8536 NO DOC ELDER MAL SCRN: HCPCS | Performed by: PSYCHIATRY & NEUROLOGY

## 2021-04-08 PROCEDURE — G8427 DOCREV CUR MEDS BY ELIG CLIN: HCPCS | Performed by: PSYCHIATRY & NEUROLOGY

## 2021-04-08 PROCEDURE — 99214 OFFICE O/P EST MOD 30 MIN: CPT | Performed by: PSYCHIATRY & NEUROLOGY

## 2021-04-08 PROCEDURE — G8510 SCR DEP NEG, NO PLAN REQD: HCPCS | Performed by: PSYCHIATRY & NEUROLOGY

## 2021-04-08 PROCEDURE — 3017F COLORECTAL CA SCREEN DOC REV: CPT | Performed by: PSYCHIATRY & NEUROLOGY

## 2021-04-08 PROCEDURE — G8419 CALC BMI OUT NRM PARAM NOF/U: HCPCS | Performed by: PSYCHIATRY & NEUROLOGY

## 2021-04-08 PROCEDURE — 1101F PT FALLS ASSESS-DOCD LE1/YR: CPT | Performed by: PSYCHIATRY & NEUROLOGY

## 2021-04-08 RX ORDER — ASCORBIC ACID 500 MG
TABLET ORAL
COMMUNITY
End: 2021-10-26 | Stop reason: ALTCHOICE

## 2021-04-08 RX ORDER — CARBIDOPA AND LEVODOPA 25; 100 MG/1; MG/1
1 TABLET ORAL 4 TIMES DAILY
Qty: 360 TAB | Refills: 4 | Status: SHIPPED | OUTPATIENT
Start: 2021-04-08 | End: 2022-02-28 | Stop reason: SDUPTHER

## 2021-04-08 RX ORDER — ENTACAPONE 200 MG/1
200 TABLET ORAL 4 TIMES DAILY
Qty: 360 TAB | Refills: 5 | Status: SHIPPED | OUTPATIENT
Start: 2021-04-08 | End: 2022-02-28 | Stop reason: SDUPTHER

## 2021-04-08 NOTE — PATIENT INSTRUCTIONS
Office Policies    o Phone calls/patient messages:  Please allow up to 24 hours for someone in the office to contact you about your call or message. Be mindful your provider may be out of the office or your message may require further review. We encourage you to use [employer] for your messages as this is a faster, more efficient way to communicate with our office    o Medication Refills:  Prescription medications require up to 48 business hours to process. We encourage you to use [employer] for your refills. For controlled medications: Please allow up to 72 business hours to process. Certain medications may require you to  a written prescription at our office. NO narcotic/controlled medications will be prescribed after 4pm Monday through Friday or on weekends    o Form/Paperwork Completion:  We ask that you allow 7-14 business days. You may also download your forms to [employer] to have your doctor print off. Due to COVID-19, please note there may be a longer wait time than usual. Thank you       Preventing Falls: Care Instructions  Your Care Instructions     Getting around your home safely can be a challenge if you have injuries or health problems that make it easy for you to fall. Loose rugs and furniture in walkways are among the dangers for many older people who have problems walking or who have poor eyesight. People who have conditions such as arthritis, osteoporosis, or dementia also have to be careful not to fall. You can make your home safer with a few simple measures. Follow-up care is a key part of your treatment and safety. Be sure to make and go to all appointments, and call your doctor if you are having problems. It's also a good idea to know your test results and keep a list of the medicines you take. How can you care for yourself at home? Taking care of yourself  · You may get dizzy if you do not drink enough water. To prevent dehydration, drink plenty of fluids.  Choose water and other caffeine-free clear liquids. If you have kidney, heart, or liver disease and have to limit fluids, talk with your doctor before you increase the amount of fluids you drink. · Exercise regularly to improve your strength, muscle tone, and balance. Walk if you can. Swimming may be a good choice if you cannot walk easily. · Have your vision and hearing checked each year or any time you notice a change. If you have trouble seeing and hearing, you might not be able to avoid objects and could lose your balance. · Know the side effects of the medicines you take. Ask your doctor or pharmacist whether the medicines you take can affect your balance. Sleeping pills or sedatives can affect your balance. · Limit the amount of alcohol you drink. Alcohol can impair your balance and other senses. · Ask your doctor whether calluses or corns on your feet need to be removed. If you wear loose-fitting shoes because of calluses or corns, you can lose your balance and fall. · Talk to your doctor if you have numbness in your feet. Preventing falls at home  · Remove raised doorway thresholds, throw rugs, and clutter. Repair loose carpet or raised areas in the floor. · Move furniture and electrical cords to keep them out of walking paths. · Use nonskid floor wax, and wipe up spills right away, especially on ceramic tile floors. · If you use a walker or cane, put rubber tips on it. If you use crutches, clean the bottoms of them regularly with an abrasive pad, such as steel wool. · Keep your house well lit, especially [facility], and outside walkways. Use night-lights in areas such as hallways and bathrooms. Add extra light switches or use remote switches (such as switches that go on or off when you clap your hands) to make it easier to turn lights on if you have to get up during the night. · Install sturdy handrails on stairways.   · Move items in your cabinets so that the things you use a lot are on the lower shelves (about waist level). · Keep a cordless phone and a flashlight with new batteries by your bed. If possible, put a phone in each of the main rooms of your house, or carry a cell phone in case you fall and cannot reach a phone. Or, you can wear a device around your neck or wrist. You push a button that sends a signal for help. · Wear low-heeled shoes that fit well and give your feet good support. Use footwear with nonskid soles. Check the heels and soles of your shoes for wear. Repair or replace worn heels or soles. · Do not wear socks without shoes on wood floors. · Walk on the grass when the sidewalks are slippery. If you live in an area that gets snow and ice in the winter, sprinkle salt on slippery steps and sidewalks. Preventing falls in the bath  · Install grab bars and nonskid mats inside and outside your shower or tub and near the toilet and sinks. · Use shower chairs and bath benches. · Use a hand-held shower head that will allow you to sit while showering. · Get into a tub or shower by putting the weaker leg in first. Get out of a tub or shower with your strong side first.  · Repair loose toilet seats and consider installing a raised toilet seat to make getting on and off the toilet easier. · Keep your bathroom door unlocked while you are in the shower. Where can you learn more? Go to http://www.[employer].com/  Enter G117 in the search box to learn more about \"Preventing Falls: Care Instructions. \"  Current as of: December 7, 2020               Content Version: 12.8  © 8022-6904 [employer]. Care instructions adapted under license by [employer] (which disclaims liability or warranty for this information). If you have questions about a medical condition or this instruction, always ask your healthcare professional. [address] any warranty or liability for your use of this information.

## 2021-04-08 NOTE — PROGRESS NOTES
Consult  REFERRED BY:  Rich Chiu MD    CHIEF COMPLAINT: Increased wearing off phenomena and slowness of movement    Subjective:     Jac Junior is a 78 y.o. right-handed  male seen at the request of Dr. Guanaco Marinelli for evaluation of multiple neurologic complaints including wearing off phenomena and increased tremor and feeling that he needs more medication, despite taking his Sinemet 4 times a day at 6 AM noon 6 PM and midnight, which seems to help control his symptoms, but he still has periods where the medicine seems to wear off. He did not like the Eldepryl, and could not afford the Azilect, and says he has to have a medication that is inexpensive. He mentioned increasing his Sinemet, but I told him maybe the first thing to do would be to try something like Comtan to make his medicines last longer, and if that doesn't work we could try a dopamine agonist next. Other options may be Nourianz, but I think he has to fail his other medicines first. If he tolerates the compound, the once a day extended release medicine may be of benefit for him. He also has essential tremor and currently takes Mysoline 50 mg a day that doesn't seem to be too out of control. He is trying to exercise regularly, and is able to move fairly well except when he has the off phenomena. Patient's tremor is accentuated when he has to self catheterize himself, which he does 4 times a day, because of neurogenic bladder that occurred after he had abdominal abscess requiring surgery in 2016. The patient noted the tremors more when he tries to do things that responded to Mysoline he became depressed and now is on a half a tablet tolerating that better. He took Lexapro for a while because of depression. He has a family history of tremors in his paternal aunt tremors and and one brother also was chronically abusing alcohol. 1 maternal aunt had Parkinson's disease.   He notices that his voice is softer, the tremor sometimes will occur when he is at rest worse in the right arm compared to the left arm, and he feels that he is moving slower and complains of generalized weakness. He tries exercise regularly, and does not have the endurance he used to. When he goes to bed he has to catheterize himself about 4 hours later he can never go back to bed. He speaks with a soft voice and may have smaller handwriting. Any thing that gets him upset makes his tremor worse. He has had recent U92 level and folic acid levels that were normal, but he does have mild anemia with macrocytosis. He had normal vitamin D levels recently, normal thyroid, normal metabolic screen except for some cholesterol problems. He is also had recent 8 pound weight loss even though he tries to eat regularly, he walks about 3 miles a day. He takes some vitamins on a regular basis. He has no unusual neck or thoracic pain or lumbar pain. His cranial nerves II through XII are otherwise intact has no difficulty speaking or swallowing other than the soft voice. He has had no falls or injuries and no other toxin exposure or neuroleptic exposure. Patient also had a normal carotid Doppler study just recently. He denies any history of stroke or TIA.     Past Medical History:   Diagnosis Date    Arthritis     gouty arthritis in feet & ankles    Gout     HTN (hypertension)       Past Surgical History:   Procedure Laterality Date    COLONOSCOPY N/A 12/28/2016    COLONOSCOPY performed by [facility] at [facility]    HX COLONOSCOPY  2010    HX GI  12/29/2016    LAPAROSCOPIC SIGMOID COLECTOMY/ CYSTOSCOPY/ BILATERAL URETERAL STENT PLACEMENT AND REMOVAL    HX ORTHOPAEDIC      Lumbar surgery age 24    HX UROLOGICAL      bladder     Family History   Problem Relation Age of Onset    Other Mother         old age   Effie Officer Heart Disease Father     Diabetes Father     Heart Disease Brother     Alcohol abuse Brother     Other Brother         pancreatitis    Anxiety Child     Depression Child     Attention Deficit Disorder Child       Social History     Tobacco Use    Smoking status: Never Smoker    Smokeless tobacco: Never Used   Substance Use Topics    Alcohol use: No         Current Outpatient Medications:     ascorbic acid, vitamin C, (Vitamin C) 500 mg tablet, Take  by mouth., Disp: , Rfl:     MAGNESIUM PO, Take  by mouth., Disp: , Rfl:     BLUEBERRY FLAVOR, by Does Not Apply route., Disp: , Rfl:     entacapone (Comtan) 200 mg tablet, Take 1 Tab by mouth four (4) times daily. , Disp: 360 Tab, Rfl: 5    carbidopa-levodopa (Sinemet)  mg per tablet, Take 1 Tab by mouth four (4) times daily. Indications: a type of movement disorder called parkinsonism, restless legs syndrome, an extreme discomfort in the calf muscles when sitting or lying down, Disp: 360 Tab, Rfl: 4    allopurinoL (ZYLOPRIM) 100 mg tablet, Take  by mouth daily. , Disp: , Rfl:     Boswellia alexander extract (BOSWELLIA ALEXANDER XT, BULK,), by Does Not Apply route., Disp: , Rfl:     cholecalciferol (VITAMIN D3) 25 mcg (1,000 unit) cap, Take  by mouth daily. , Disp: , Rfl:         No Known Allergies   MRI Results (most recent):  Results from Orders Only encounter on 08/17/20   MRI BRAIN W WO CONT    Narrative EXAM:  MRI BRAIN W WO CONT    INDICATION:    tremors    COMPARISON:  None. CONTRAST: 20 ml Dotarem. TECHNIQUE:    Multiplanar multisequence acquisition without and with contrast of the brain. FINDINGS:  Stat diffusion imaging does not show acute ischemic changes. Moderate atrophy, mild nonspecific white matter changes. No extra-axial fluid collection, hemorrhage or shift. Flow voids in major vessels at the base of the brain are present. No enhancing lesion no masses. Impression IMPRESSION: Atrophy and white matter disease. No acute findings no mass.        Results from Orders Only encounter on 08/17/20   MRI BRAIN W WO CONT    Narrative EXAM:  MRI BRAIN W WO CONT    INDICATION: tremors    COMPARISON:  None. CONTRAST: 20 ml Dotarem. TECHNIQUE:    Multiplanar multisequence acquisition without and with contrast of the brain. FINDINGS:  Stat diffusion imaging does not show acute ischemic changes. Moderate atrophy, mild nonspecific white matter changes. No extra-axial fluid collection, hemorrhage or shift. Flow voids in major vessels at the base of the brain are present. No enhancing lesion no masses. Impression IMPRESSION: Atrophy and white matter disease. No acute findings no mass. Review of Systems:  A comprehensive review of systems was negative except for: Constitutional: positive for fatigue, malaise and weight loss  Genitourinary: positive for urinary incontinence  Musculoskeletal: positive for myalgias, arthralgias, stiff joints and muscle weakness  Neurological: positive for gait problems, tremor and weakness  Behvioral/Psych: positive for anxiety and depression   Vitals:    04/08/21 0934   BP: (!) 152/86   Pulse: (!) 59   SpO2: 99%   Weight: 210 lb (95.3 kg)   Height: 6' 3\" (1.905 m)     Objective:     I      NEUROLOGICAL EXAM:    Appearance: The patient is well developed, well nourished, provides a coherent history and is in no acute distress. Mental Status: Oriented to time, place and person, and the president, cognitive function is normal and speech is fluent and no aphasia or dysarthria. Mood and affect appropriate. Cranial Nerves:   Intact visual fields. Fundi are benign, disc are flat, no lesions seen on funduscopy. TARAS, EOM's full, no nystagmus, no ptosis. Facial sensation is normal. Corneal reflexes are not tested. Facial movement is symmetric. Hearing is normal bilaterally. Palate is midline with normal sternocleidomastoid and trapezius muscles are normal. Tongue is midline.   Neck without meningismus or bruits  Temporal arteries are not tender or enlarged  TMJ areas are not tender on palpation   Motor:  5/5 strength in upper and lower proximal and distal muscles. Normal bulk and tone throughout except slight increased muscle tone and rigidity and bradykinesia in both upper extremities with reinforcement. No fasciculations. Rapid alternating movement is symmetric and intact bilaterally   Reflexes:   Deep tendon reflexes 2+/4 and symmetrical.  No babinski or clonus present   Sensory:   Normal to touch, pinprick and vibration and temperature intact except slight decrease in both feet. DSS is intact   Gait:  Normal gait for patient's age, except that the patient may move a little slowly and has decreased arm swing on the right. .   Tremor:    Mild bilateral intention tremor noted in both upper extremities, and a mild to moderate somewhat fluctuating rest tremor and static tremor in the right upper extremity and mild involvement in the left upper extremity. Patient has mild micrographia and a slightly soft voice. .   Cerebellar:  No abnormal cerebellar signs present on Romberg and tandem testing and finger-nose-finger exam shows the bilateral intention tremor. Neurovascular:  Normal heart sounds and regular rhythm, peripheral pulses decreased, and no carotid bruits. Assessment:       ICD-10-CM ICD-9-CM    1. Benign essential tremor syndrome  G25.0 333.1 XR SPINE CERV W OBL/FLEX/EXT MIN 6 V COMP      REFERRAL TO PHYSICAL THERAPY      entacapone (Comtan) 200 mg tablet      carbidopa-levodopa (Sinemet)  mg per tablet   2. Parkinson's disease (tremor, stiffness, slow motion, unstable posture) ([facility])  G20 332.0 XR SPINE CERV W OBL/FLEX/EXT MIN 6 V COMP      REFERRAL TO PHYSICAL THERAPY      entacapone (Comtan) 200 mg tablet      carbidopa-levodopa (Sinemet)  mg per tablet   3. Tremors of nervous system  R25.1 781.0 XR SPINE CERV W OBL/FLEX/EXT MIN 6 V COMP      REFERRAL TO PHYSICAL THERAPY      entacapone (Comtan) 200 mg tablet      carbidopa-levodopa (Sinemet)  mg per tablet   4.  Cervical radiculopathy due to degenerative joint disease of spine  M47.22 721.0 XR SPINE CERV W OBL/FLEX/EXT MIN 6 V COMP      REFERRAL TO PHYSICAL THERAPY      entacapone (Comtan) 200 mg tablet      carbidopa-levodopa (Sinemet)  mg per tablet   5. Weakness generalized  R53.1 780.79 REFERRAL TO PHYSICAL THERAPY      entacapone (Comtan) 200 mg tablet      carbidopa-levodopa (Sinemet)  mg per tablet     Active Problems:    * No active hospital problems. *      Plan:     New problem of patient having increased off spells, despite taking his medication faithfully every 6 hours at 6 AM, noon, 6 PM and midnight, and patient intolerant of Eldepryl, and could not afford Azilect, and patient feels he needs new medication or needs to increase his Sinemet  We will try Comtan 4 times a day with his doses of Sinemet, and could switch to either generic Stalevo or extended release Comtan but ever is cheapest for him, if he tolerates it. He is advised of the side effects as far as increased dyskinesia or GI side effects or any side effect to call us immediately. He has essential tremor that seems stable on Mysoline 50 mg a day. For his weakness we checked a CPK CAMILLE and sed rate to rule out neuromuscular disease, and an anti-neuronal antibody titer and they were all unremarkable. He also has essential tremor that is mild in both upper extremities he is to continue with  Mysoline since the tremor of essential type really seems mild at this time. For his macrocytosis he will get an antiparietal cell antibody titer just to make sure he does not have pernicious anemia and that was okay  For his chronic insomnia recommend he try melatonin 5 to 10 mg to see if that helps first before resorting to medications.   He is to eat a good diet, watch his weight and continue regular exercise program.  We advised to do a combination of cardiovascular strength strengthening exercise, and stretching and balance and flexibility  Patient may need an EMG to evaluate his weakness, we will await the above-mentioned test first.  Recommended a One-A-Day vitamin and vitamin D, and the patient is to continue a regular exercise program, which we advised him can slow the progression of the disease down. Patient will check my chart for results of his test, 1 hour for the patient going over his history, reviewing his labs, looking at his diagnosis and prognosis, and diagnostic testing needed and patient agrees with plans as outlined above. 35 minutes spent with the patient today, discussing his therapeutic options, and other options in the future we discussed with him with dopamine agonist or extended release Comtan or Stalevo or adenosine blockers  He will call if any problem in the interim otherwise we will see him in 3 months time.           Signed By: Trent Jordan MD     April 8, 2021       CC: Vidhya Lang MD  FAX: [phone]

## 2021-04-08 NOTE — LETTER
4/8/2021    Patient: Garland Morse   YOB: 1955   Date of Visit: 4/8/2021     Andrew Awan MD  [address] [place] [address]  [address] [address]  Via Fax: [phone]    Dear Andrew Awan MD,      Thank you for referring Mr. Damir Valadez to [address] [place] [address] for evaluation. My notes for this consultation are attached. Consult  REFERRED BY:  Truong Tolentino MD    CHIEF COMPLAINT: Increased wearing off phenomena and slowness of movement    Subjective:     Garland Morse is a 78 y.o. right-handed  male seen at the request of Dr. Angel Luis Gayle for evaluation of multiple neurologic complaints including wearing off phenomena and increased tremor and feeling that he needs more medication, despite taking his Sinemet 4 times a day at 6 AM noon 6 PM and midnight, which seems to help control his symptoms, but he still has periods where the medicine seems to wear off. He did not like the Eldepryl, and could not afford the Azilect, and says he has to have a medication that is inexpensive. He mentioned increasing his Sinemet, but I told him maybe the first thing to do would be to try something like Comtan to make his medicines last longer, and if that doesn't work we could try a dopamine agonist next. Other options may be Nourianz, but I think he has to fail his other medicines first. If he tolerates the compound, the once a day extended release medicine may be of benefit for him. He also has essential tremor and currently takes Mysoline 50 mg a day that doesn't seem to be too out of control. He is trying to exercise regularly, and is able to move fairly well except when he has the off phenomena. Patient's tremor is accentuated when he has to self catheterize himself, which he does 4 times a day, because of neurogenic bladder that occurred after he had abdominal abscess requiring surgery in 2016.   The patient noted the tremors more when he tries to do things that responded to Mysoline he became depressed and now is on a half a tablet tolerating that better. He took Lexapro for a while because of depression. He has a family history of tremors in his paternal aunt tremors and and one brother also was chronically abusing alcohol. 1 maternal aunt had Parkinson's disease. He notices that his voice is softer, the tremor sometimes will occur when he is at rest worse in the right arm compared to the left arm, and he feels that he is moving slower and complains of generalized weakness. He tries exercise regularly, and does not have the endurance he used to. When he goes to bed he has to catheterize himself about 4 hours later he can never go back to bed. He speaks with a soft voice and may have smaller handwriting. Any thing that gets him upset makes his tremor worse. He has had recent W08 level and folic acid levels that were normal, but he does have mild anemia with macrocytosis. He had normal vitamin D levels recently, normal thyroid, normal metabolic screen except for some cholesterol problems. He is also had recent 8 pound weight loss even though he tries to eat regularly, he walks about 3 miles a day. He takes some vitamins on a regular basis. He has no unusual neck or thoracic pain or lumbar pain. His cranial nerves II through XII are otherwise intact has no difficulty speaking or swallowing other than the soft voice. He has had no falls or injuries and no other toxin exposure or neuroleptic exposure. Patient also had a normal carotid Doppler study just recently. He denies any history of stroke or TIA.     Past Medical History:   Diagnosis Date    Arthritis     gouty arthritis in feet & ankles    Gout     HTN (hypertension)       Past Surgical History:   Procedure Laterality Date    COLONOSCOPY N/A 12/28/2016    [facility] COLONOSCOPY  2010    HX GI  12/29/2016    LAPAROSCOPIC SIGMOID COLECTOMY/ CYSTOSCOPY/ BILATERAL URETERAL STENT PLACEMENT AND REMOVAL    HX ORTHOPAEDIC      Lumbar surgery age 24    HX UROLOGICAL      bladder     Family History   Problem Relation Age of Onset    Other Mother         old age   [facility] Heart Disease Father     Diabetes Father     Heart Disease Brother     Alcohol abuse Brother     Other Brother         pancreatitis    Anxiety Child     Depression Child     Attention Deficit Disorder Child       Social History     Tobacco Use    Smoking status: Never Smoker    Smokeless tobacco: Never Used   Substance Use Topics    Alcohol use: No         Current Outpatient Medications:     ascorbic acid, vitamin C, (Vitamin C) 500 mg tablet, Take  by mouth., Disp: , Rfl:     MAGNESIUM PO, Take  by mouth., Disp: , Rfl:     BLUEBERRY FLAVOR, by Does Not Apply route., Disp: , Rfl:     entacapone (Comtan) 200 mg tablet, Take 1 Tab by mouth four (4) times daily. , Disp: 360 Tab, Rfl: 5    carbidopa-levodopa (Sinemet)  mg per tablet, Take 1 Tab by mouth four (4) times daily. Indications: a type of movement disorder called parkinsonism, restless legs syndrome, an extreme discomfort in the calf muscles when sitting or lying down, Disp: 360 Tab, Rfl: 4    allopurinoL (ZYLOPRIM) 100 mg tablet, Take  by mouth daily. , Disp: , Rfl:     Boswellia alexander extract (BOSWELLIA ALEXANDER XT, BULK,), by Does Not Apply route., Disp: , Rfl:     cholecalciferol (VITAMIN D3) 25 mcg (1,000 unit) cap, Take  by mouth daily. , Disp: , Rfl:         No Known Allergies   MRI Results (most recent):  Results from Orders Only encounter on 08/17/20   MRI BRAIN W WO CONT    Narrative EXAM:  MRI BRAIN W WO CONT    INDICATION:    tremors    COMPARISON:  None. CONTRAST: 20 ml Dotarem. TECHNIQUE:    Multiplanar multisequence acquisition without and with contrast of the brain. FINDINGS:  Stat diffusion imaging does not show acute ischemic changes.   Moderate atrophy, mild nonspecific white matter changes. No extra-axial fluid collection, hemorrhage or shift. Flow voids in major vessels at the base of the brain are present. No enhancing lesion no masses. Impression IMPRESSION: Atrophy and white matter disease. No acute findings no mass. Results from Orders Only encounter on 08/17/20   MRI BRAIN W WO CONT    Narrative EXAM:  MRI BRAIN W WO CONT    INDICATION:    tremors    COMPARISON:  None. CONTRAST: 20 ml Dotarem. TECHNIQUE:    Multiplanar multisequence acquisition without and with contrast of the brain. FINDINGS:  Stat diffusion imaging does not show acute ischemic changes. Moderate atrophy, mild nonspecific white matter changes. No extra-axial fluid collection, hemorrhage or shift. Flow voids in major vessels at the base of the brain are present. No enhancing lesion no masses. Impression IMPRESSION: Atrophy and white matter disease. No acute findings no mass. Review of Systems:  A comprehensive review of systems was negative except for: Constitutional: positive for fatigue, malaise and weight loss  Genitourinary: positive for urinary incontinence  Musculoskeletal: positive for myalgias, arthralgias, stiff joints and muscle weakness  Neurological: positive for gait problems, tremor and weakness  Behvioral/Psych: positive for anxiety and depression   Vitals:    04/08/21 0934   BP: (!) 152/86   Pulse: (!) 59   SpO2: 99%   Weight: 210 lb (95.3 kg)   Height: 6' 3\" (1.905 m)     Objective:     I      NEUROLOGICAL EXAM:    Appearance: The patient is well developed, well nourished, provides a coherent history and is in no acute distress. Mental Status: Oriented to time, place and person, and the president, cognitive function is normal and speech is fluent and no aphasia or dysarthria. Mood and affect appropriate. Cranial Nerves:   Intact visual fields. Fundi are benign, disc are flat, no lesions seen on funduscopy. TARAS, EOM's full, no nystagmus, no ptosis. Facial sensation is normal. Corneal reflexes are not tested. Facial movement is symmetric. Hearing is normal bilaterally. Palate is midline with normal sternocleidomastoid and trapezius muscles are normal. Tongue is midline. Neck without meningismus or bruits  Temporal arteries are not tender or enlarged  TMJ areas are not tender on palpation   Motor:  5/5 strength in upper and lower proximal and distal muscles. Normal bulk and tone throughout except slight increased muscle tone and rigidity and bradykinesia in both upper extremities with reinforcement. No fasciculations. Rapid alternating movement is symmetric and intact bilaterally   Reflexes:   Deep tendon reflexes 2+/4 and symmetrical.  No babinski or clonus present   Sensory:   Normal to touch, pinprick and vibration and temperature intact except slight decrease in both feet. DSS is intact   Gait:  Normal gait for patient's age, except that the patient may move a little slowly and has decreased arm swing on the right. .   Tremor:    Mild bilateral intention tremor noted in both upper extremities, and a mild to moderate somewhat fluctuating rest tremor and static tremor in the right upper extremity and mild involvement in the left upper extremity. Patient has mild micrographia and a slightly soft voice. .   Cerebellar:  No abnormal cerebellar signs present on Romberg and tandem testing and finger-nose-finger exam shows the bilateral intention tremor. Neurovascular:  Normal heart sounds and regular rhythm, peripheral pulses decreased, and no carotid bruits. Assessment:       ICD-10-CM ICD-9-CM    1. Benign essential tremor syndrome  G25.0 333.1 XR SPINE CERV W OBL/FLEX/EXT MIN 6 V COMP      REFERRAL TO PHYSICAL THERAPY      entacapone (Comtan) 200 mg tablet      carbidopa-levodopa (Sinemet)  mg per tablet   2.  Parkinson's disease (tremor, stiffness, slow motion, unstable posture) ([facility])  G20 332.0 XR SPINE CERV W OBL/FLEX/EXT MIN 6 V COMP REFERRAL TO PHYSICAL THERAPY      entacapone (Comtan) 200 mg tablet      carbidopa-levodopa (Sinemet)  mg per tablet   3. Tremors of nervous system  R25.1 781.0 XR SPINE CERV W OBL/FLEX/EXT MIN 6 V COMP      REFERRAL TO PHYSICAL THERAPY      entacapone (Comtan) 200 mg tablet      carbidopa-levodopa (Sinemet)  mg per tablet   4. Cervical radiculopathy due to degenerative joint disease of spine  M47.22 721.0 XR SPINE CERV W OBL/FLEX/EXT MIN 6 V COMP      REFERRAL TO PHYSICAL THERAPY      entacapone (Comtan) 200 mg tablet      carbidopa-levodopa (Sinemet)  mg per tablet   5. Weakness generalized  R53.1 780.79 REFERRAL TO PHYSICAL THERAPY      entacapone (Comtan) 200 mg tablet      carbidopa-levodopa (Sinemet)  mg per tablet     Active Problems:    * No active hospital problems. *      Plan:     New problem of patient having increased off spells, despite taking his medication faithfully every 6 hours at 6 AM, noon, 6 PM and midnight, and patient intolerant of Eldepryl, and could not afford Azilect, and patient feels he needs new medication or needs to increase his Sinemet  We will try Comtan 4 times a day with his doses of Sinemet, and could switch to either generic Stalevo or extended release Comtan but ever is cheapest for him, if he tolerates it. He is advised of the side effects as far as increased dyskinesia or GI side effects or any side effect to call us immediately. He has essential tremor that seems stable on Mysoline 50 mg a day. For his weakness we checked a CPK CAMILLE and sed rate to rule out neuromuscular disease, and an anti-neuronal antibody titer and they were all unremarkable. He also has essential tremor that is mild in both upper extremities he is to continue with  Mysoline since the tremor of essential type really seems mild at this time.   For his macrocytosis he will get an antiparietal cell antibody titer just to make sure he does not have pernicious anemia and that was okay  For his chronic insomnia recommend he try melatonin 5 to 10 mg to see if that helps first before resorting to medications. He is to eat a good diet, watch his weight and continue regular exercise program.  We advised to do a combination of cardiovascular strength strengthening exercise, and stretching and balance and flexibility  Patient may need an EMG to evaluate his weakness, we will await the above-mentioned test first.  Recommended a One-A-Day vitamin and vitamin D, and the patient is to continue a regular exercise program, which we advised him can slow the progression of the disease down. Patient will check my chart for results of his test, 1 hour for the patient going over his history, reviewing his labs, looking at his diagnosis and prognosis, and diagnostic testing needed and patient agrees with plans as outlined above. 35 minutes spent with the patient today, discussing his therapeutic options, and other options in the future we discussed with him with dopamine agonist or extended release Comtan or Stalevo or adenosine blockers  He will call if any problem in the interim otherwise we will see him in 3 months time. Signed By: Gisele Sanders MD     April 8, 2021       CC: Severo Jews, MD  FAX: [phone]            If you have questions, please do not hesitate to call me. I look forward to following your patient along with you.       Sincerely,    Gisele Sanders MD

## 2021-04-09 ENCOUNTER — HOSPITAL ENCOUNTER (OUTPATIENT)
Dept: GENERAL RADIOLOGY | Age: 66
Discharge: HOME | End: 2021-04-09
Payer: MEDICARE

## 2021-04-09 DIAGNOSIS — G20 PARKINSON'S DISEASE (TREMOR, STIFFNESS, SLOW MOTION, UNSTABLE POSTURE) (HCC): ICD-10-CM

## 2021-04-09 DIAGNOSIS — R25.1 TREMORS OF NERVOUS SYSTEM: ICD-10-CM

## 2021-04-09 DIAGNOSIS — M47.22 CERVICAL RADICULOPATHY DUE TO DEGENERATIVE JOINT DISEASE OF SPINE: ICD-10-CM

## 2021-04-09 DIAGNOSIS — G25.0 BENIGN ESSENTIAL TREMOR SYNDROME: ICD-10-CM

## 2021-04-09 PROCEDURE — 72052 X-RAY EXAM NECK SPINE 6/>VWS: CPT

## 2021-04-19 ENCOUNTER — APPOINTMENT (OUTPATIENT)
Dept: PHYSICAL THERAPY | Age: 66
End: 2021-04-19

## 2021-04-20 ENCOUNTER — HOSPITAL ENCOUNTER (OUTPATIENT)
Dept: PHYSICAL THERAPY | Age: 66
Discharge: HOME | End: 2021-04-20
Payer: MEDICARE

## 2021-04-20 PROCEDURE — 97162 PT EVAL MOD COMPLEX 30 MIN: CPT

## 2021-04-20 PROCEDURE — 97110 THERAPEUTIC EXERCISES: CPT

## 2021-04-20 NOTE — PROGRESS NOTES
PT INITIAL EVALUATION NOTE - [facility] 2-15    Patient Name: Neyda House  Date:2021  : 1955  [x]  Patient  Verified  [place]or: [place] MED[place] / Plan: [place] MEDICARE PART A & B / Product Type: Medicare /    In time:  1:05 PM  Out time:  2:06 PM  Total Treatment Time (min): 61 minutes  Total Timed Codes (min): 32 minutes  1:1 Treatment Time ( only): 32 minutes  Visit #: 1     Treatment Area: Tremor, unspecified [R25.1]  Other spondylosis with radiculopathy, cervical region [M47.22]  Weakness [R53.1]    SUBJECTIVE  Pain Level (0-10 scale): 3/10  Any medication changes, allergies to medications, adverse drug reactions, diagnosis change, or new procedure performed?: [] No    [x] Yes (see summary sheet for update)  Subjective: The Pt complains of L sided neck and shoulder pain that started insidiously for many years, but has gotten progressively worse. He complains of a lot of tightness along the neck and top of shoulder. He has some aching and \"an electric sensation\" along the lateral side of the L shoulder and down the arm; this stops proximal to the elbow. He is R handed. He has noticed some decreased strength in the last year since diagnosed with Parkinson's Disease. Aggravating factors include: golf, long walks (starts 2-3 minutes into the walk), lying on the L side, looking to the L, sitting up erect. Relieving factors include: massage, topical creams, stretching. He is independent with his ADLs. He is very active- golf 3x/wk (walks 9 holes), brisk walks for 2 miles 3-4x/wk, YEN yoga and light weight exercises. PMH: Parkinson's Disease. OBJECTIVE/EXAMINATION  Posture:   Forward head and protracted shoulders    Neurological: Sensations: Intact to light touch sensation C5-T2 bilaterally    Cervical AROM:       Flexion: 55    Extension: 21     Side Bending: Right: NT Left:NT     Rotation: Right: 51  Left: 33     Shoulder AROM:  Right  Left   Flexion:  Mild  Mild   Abduction:  Mild  Mild   ER:   Mild  WFL   IR:   Moderate  WFL    UPPER QUARTER   MUSCLE STRENGTH  KEY       R  L  0 - No Contraction  Shoulder Flexion 5  4+  1 - Trace   Shoulder Abduction 5  4+  2 - Poor   Shoulder ER  5  5  3 - Fair    Shoulder IR  5  5  4 - Good   Elbow Flexion  5  5  5 - Normal   Elbow Extension 5  5         Palpation: TTP and increased turgor along L UT and levator  Joint Assessment: Decreased cervical downslides bilaterally and PA glides, decreased L inferior shoulder glides        Special Tests:Cervical Compression: NT   Peng's Sign: NT    Spurling Test: NT    IR Lift Off: NT    Duggan-Martin: NT    ER Lag Sign: NT    Empty Can: NT    Speed's Test: NT    Others: NT     Clonus: NT      32 min Therapeutic Exercise:  [x] See flow sheet :   Rationale: increase ROM, increase strength and increase proprioception to improve the patients ability to perform ADLs with less pain or discomfort. With   [x] TE   [] TA   [] Neuro   [] SC   [x] other: Patient Education: [x] Review HEP    [] Progressed/Changed HEP based on:   [] positioning   [] body mechanics   [] transfers   [] heat/ice application    [x] other: Pt educated on diagnosis and prognosis with therapy.      Other Objective/Functional Measures: FOTO Functional Measure: 34/100                         Pain Level (0-10 scale) post treatment: 3/10    ASSESSMENT/Changes in Function:     [x]  See Plan of Vicki Santoyo, PT 4/20/2021

## 2021-04-20 NOTE — PROGRESS NOTES
[facility]. Physical Therapy  [address] ([facility] [address] [place] Aditi Daly  Phone: [phone] Fax: [phone]     Plan of Care/Statement of Necessity for Physical Therapy Services  2-15    Patient name: Li Sher  : 1955  Provider#: [id number]  Referral source: Arsh Schneider MD      Medical/Treatment Diagnosis: Tremor, unspecified [R25.1]  Other spondylosis with radiculopathy, cervical region [M47.22]  Weakness [R53.1]     Prior Hospitalization: see medical history     Comorbidities: Arthritis, kidney, bladder, prostate or urination problems, neurological disease  Prior Level of Function: The patient completed 20 minutes of exercise at least 3 times a week. Medications: Verified on Patient Summary List  Start of Care: 20      Onset Date: Chronic   The Plan of Care and following information is based on the information from the initial evaluation. Assessment/ key information: The Pt is a pleasant and motivated 78year old male who presents to therapy with chronic L-sided neck and shoulder pain. Based on examination, the Pt presents with decreased cervical rotation ROM, decreased cervical spinal mobility, poor postural strength and awareness, decreased scapular strength and stability, restrictions in his neck musculature flexibility, and decreased activity tolerance and endurance. The patient would benefit from skilled physical therapy to help improve the above listed impairments to allow the patient to safely return to their prior level of function with less overall pain or risk of further injury. The patient has a good prognosis with skilled physical therapy.     Evaluation Complexity History HIGH Complexity :3+ comorbidities / personal factors will impact the outcome/ POC ; Examination MEDIUM Complexity : 3 Standardized tests and measures addressing body structure, function, activity limitation and / or participation in recreation  ;Presentation MEDIUM Complexity : Evolving with changing characteristics  ; Clinical Decision Making MEDIUM Complexity : FOTO score of 26-74  Overall Complexity Rating: MEDIUM    Problem List: pain affecting function, decrease ROM, decrease strength, decrease ADL/ functional abilitiies, decrease activity tolerance, decrease flexibility/ joint mobility and decrease transfer abilities   Treatment Plan may include any combination of the following: Therapeutic exercise, Therapeutic activities, Neuromuscular re-education, Physical agent/modality, Manual therapy, Patient education, Self Care training, Functional mobility training, Home safety training and Other: Functional dry needling  Patient / Family readiness to learn indicated by: asking questions and interest  Persons(s) to be included in education: patient (P)  Barriers to Learning/Limitations: None  Patient Goal (s): reduce pain, eliminate trigger points  Patient Self Reported Health Status: fair  Rehabilitation Potential: good    Short Term Goals: To be accomplished in 6 treatments:  1. The Pt will be independent and compliant with their HEP. 2. The Pt will report a 50% reduction in their pain with ADLs. Long Term Goals: To be accomplished in 16 treatments:  1. The Pt will score the MCII on his FOTO survey demonstrating improved overall function (34 to 55 points). 2. The Pt will have >/= 50 degrees of cervical rotation bilaterally to allow the Pt to be able to look over his shoulders with less difficulty. 3. The Pt will be able to walk >/= 15 minutes before any neck pain starts to allow the Pt to be able to walk recreationally with less discomfort. 4. The Pt will be able to golf >/= 4 holes with 0-2/10 pain to allow the pt to be able to golf with less discomfort. Frequency / Duration: Patient to be seen 1-2 times per week for 16 treatments.     Patient/ Caregiver education and instruction: self care, activity modification and exercises    [x] Plan of care has been reviewed with PTA    Certification Period: 4/20/21-7/19/21    Jonas Jimenez, PT 4/20/2021     ________________________________________________________________________    I certify that the above Therapy Services are being furnished while the patient is under my care. I agree with the treatment plan and certify that this therapy is necessary.     Physician's Signature:____________________  Date:____________Time: _________         Mariela Beverly MD

## 2021-04-26 ENCOUNTER — HOSPITAL ENCOUNTER (OUTPATIENT)
Dept: PHYSICAL THERAPY | Age: 66
Discharge: HOME | End: 2021-04-26
Payer: MEDICARE

## 2021-04-26 PROCEDURE — 97110 THERAPEUTIC EXERCISES: CPT

## 2021-04-26 NOTE — PROGRESS NOTES
PT DAILY TREATMENT NOTE - [facility] 2-15    Patient Name: Dhara Chandler  Date:2021  : 1955  [x]  Patient  Verified  Payor: [place] MED[place]ARE / Plan: [place] MEDICARE PART A & B / Product Type: Medicare /    In time: 3:30 PM  Out time: 4:21 PM  Total Treatment Time (min): 51 minutes  Total Timed Codes (min): 51 minutes  1:1 Treatment Time ( only): 47 minutes   Visit #:  2    Treatment Area: Tremor, unspecified [R25.1]  Other spondylosis with radiculopathy, cervical region [M47.22]  Weakness [R53.1]    SUBJECTIVE  Pain Level (0-10 scale): 8/10  Any medication changes, allergies to medications, adverse drug reactions, diagnosis change, or new procedure performed?: [x] No    [] Yes (see summary sheet for update)  Subjective functional status/changes:   [] No changes reported  The Pt reports that he has been able to do his exercises religiously. He was doing well overall, but yesterday he started to have a flare up of the PD. He reports that today he is still flared up and played golf and that made the pain significantly worse. OBJECTIVE    51 min Therapeutic Exercise:  [x] See flow sheet :   Rationale: increase ROM, increase strength and increase proprioception to improve the patients ability to perform ADLs with less pain or discomfort. With   [x] TE   [] TA   [] Neuro   [] SC   [] other: Patient Education: [x] Review HEP    [] Progressed/Changed HEP based on:   [] positioning   [] body mechanics   [] transfers   [] heat/ice application    [] other:      Other Objective/Functional Measures: None noted     Pain Level (0-10 scale) post treatment: 3.5/10    ASSESSMENT/Changes in Function:   The Pt required mild verbal cues to correct the form and technique of his HEP and made the corrections nicely. After these exercises, he reported feeling significant looser and less overall pain. Taught him how to use a tennis ball to perform self trigger point releases at home and he liked this greatly.   Patient will continue to benefit from skilled PT services to modify and progress therapeutic interventions, address functional mobility deficits, address ROM deficits, address strength deficits, analyze and address soft tissue restrictions, analyze and cue movement patterns, analyze and modify body mechanics/ergonomics, assess and modify postural abnormalities and instruct in home and community integration to attain remaining goals. []  See Plan of Care  []  See progress note/recertification  []  See Discharge Summary         Progress towards goals / Updated goals:  Short Term Goals: To be accomplished in 6 treatments:  1. The Pt will be independent and compliant with their HEP- progressing  2. The Pt will report a 50% reduction in their pain with ADLs- progressing  Long Term Goals: To be accomplished in 16 treatments:  1. The Pt will score the MCII on his FOTO survey demonstrating improved overall function (34 to 55 points)- progressing  2. The Pt will have >/= 50 degrees of cervical rotation bilaterally to allow the Pt to be able to look over his shoulders with less difficulty- progressing  3. The Pt will be able to walk >/= 15 minutes before any neck pain starts to allow the Pt to be able to walk recreationally with less discomfort- progressing  4.  The Pt will be able to golf >/= 4 holes with 0-2/10 pain to allow the pt to be able to golf with less discomfort- progressing    PLAN  [x]  Upgrade activities as tolerated     [x]  Continue plan of care  [x]  Update interventions per flow sheet       []  Discharge due to:_  []  Other:_      Mohamud Car, PT 4/26/2021

## 2021-05-04 ENCOUNTER — HOSPITAL ENCOUNTER (OUTPATIENT)
Dept: PHYSICAL THERAPY | Age: 66
Discharge: HOME | End: 2021-05-04
Payer: MEDICARE

## 2021-05-04 PROCEDURE — 97110 THERAPEUTIC EXERCISES: CPT

## 2021-05-04 PROCEDURE — 97140 MANUAL THERAPY 1/> REGIONS: CPT

## 2021-05-04 NOTE — PROGRESS NOTES
PT DAILY TREATMENT NOTE - [facility] 2-15    Patient Name: Carmen Farmer  Date:2021  : 1955  [x]  Patient  Verified  Payor: [place] MEDICARE / Plan: [place] MEDICARE PART A & B / Product Type: Medicare /    In time: 2:42 PM  Out time: 3:43 PM  Total Treatment Time (min): 51 minutes  Total Timed Codes (min): 51 minutes  1:1 Treatment Time ( only): 47 minutes   Visit #:  3    Treatment Area: Tremor, unspecified [R25.1]  Other spondylosis with radiculopathy, cervical region [M47.22]  Weakness [R53.1]    SUBJECTIVE  Pain Level (0-10 scale): 2/10  Any medication changes, allergies to medications, adverse drug reactions, diagnosis change, or new procedure performed?: [x] No    [] Yes (see summary sheet for update)  Subjective functional status/changes:   [] No changes reported  The Pt reports that he is feeling better today. He has been able to incorporate all of his stretches and strength exercises as well as the self-trigger point releases at home without difficulty. He can tell that the stretching is helping a lot    OBJECTIVE    41 min Therapeutic Exercise:  [x] See flow sheet :   Rationale: increase ROM, increase strength and increase proprioception to improve the patients ability to perform ADLs with less pain or discomfort. 10 min Manual Therapy: Trigger point release to L levator and scalenes; STM to R UT and lower trap    Rationale: decrease pain, increase ROM, increase tissue extensibility, decrease trigger points and increase postural awareness to improve the patients ability to perform ADLs with less pain or discomfort.     With   [x] TE   [] TA   [] Neuro   [] SC   [] other: Patient Education: [x] Review HEP    [] Progressed/Changed HEP based on:   [] positioning   [] body mechanics   [] transfers   [] heat/ice application    [] other:      Other Objective/Functional Measures: None noted     Pain Level (0-10 scale) post treatment: 1/10    ASSESSMENT/Changes in Function:   The Pt tolerated the new back extension exercises very well as well as the middle trap strengthening exercises. The exercises on the swiss ball were challenging because it also challenged balance with the Parkinson's disease; he did well with theses. Patient will continue to benefit from skilled PT services to modify and progress therapeutic interventions, address functional mobility deficits, address ROM deficits, address strength deficits, analyze and address soft tissue restrictions, analyze and cue movement patterns, analyze and modify body mechanics/ergonomics, assess and modify postural abnormalities and instruct in home and community integration to attain remaining goals. []  See Plan of Care  []  See progress note/recertification  []  See Discharge Summary         Progress towards goals / Updated goals:  Short Term Goals: To be accomplished in 6 treatments:  1. The Pt will be independent and compliant with their HEP- progressing  2. The Pt will report a 50% reduction in their pain with ADLs- progressing  Long Term Goals: To be accomplished in 16 treatments:  1. The Pt will score the MCII on his FOTO survey demonstrating improved overall function (34 to 55 points)- progressing  2. The Pt will have >/= 50 degrees of cervical rotation bilaterally to allow the Pt to be able to look over his shoulders with less difficulty- progressing  3. The Pt will be able to walk >/= 15 minutes before any neck pain starts to allow the Pt to be able to walk recreationally with less discomfort- progressing  4.  The Pt will be able to golf >/= 4 holes with 0-2/10 pain to allow the pt to be able to golf with less discomfort- progressing    PLAN  [x]  Upgrade activities as tolerated     [x]  Continue plan of care  [x]  Update interventions per flow sheet       []  Discharge due to:_  []  Other:_      Rafaela Fofana, PT 5/4/2021

## 2021-05-11 ENCOUNTER — HOSPITAL ENCOUNTER (OUTPATIENT)
Dept: PHYSICAL THERAPY | Age: 66
Discharge: HOME | End: 2021-05-11
Payer: MEDICARE

## 2021-05-11 PROCEDURE — 97110 THERAPEUTIC EXERCISES: CPT

## 2021-05-11 NOTE — PROGRESS NOTES
PT DAILY TREATMENT NOTE - [facility] 2-15    Patient Name: Sofia Olivares  Date:2021  : 1955  [x]  Patient  Verified  Payor: [place] MEDICARE / Plan: [place] MEDICARE PART A & B / Product Type: Medicare /    In time: 2:45 PM  Out time: 3:30 PM  Total Treatment Time (min): 45 minutes  Total Timed Codes (min): 45 minutes  1:1 Treatment Time ( only): 41 minutes   Visit #:  4    Treatment Area: Tremor, unspecified [R25.1]  Other spondylosis with radiculopathy, cervical region [M47.22]  Weakness [R53.1]    SUBJECTIVE  Pain Level (0-10 scale): 1/10  Any medication changes, allergies to medications, adverse drug reactions, diagnosis change, or new procedure performed?: [x] No    [] Yes (see summary sheet for update)  Subjective functional status/changes:   [] No changes reported  The Pt reports that he was pain free for ~24 hrs after his last session. He did have increased pain over the weekend so he worked out his trigger points and he woke up Monday morning pain-free and play golf yesterday morning and today (18 holes each time) without pain. He is very happy with his results thus far. OBJECTIVE    45 min Therapeutic Exercise:  [x] See flow sheet :   Rationale: increase ROM, increase strength and increase proprioception to improve the patients ability to perform ADLs with less pain or discomfort. With   [x] TE   [] TA   [] Neuro   [] SC   [] other: Patient Education: [x] Review HEP    [] Progressed/Changed HEP based on:   [] positioning   [] body mechanics   [] transfers   [] heat/ice application    [] other:      Other Objective/Functional Measures: None noted     Pain Level (0-10 scale) post treatment: 1/10    ASSESSMENT/Changes in Function:   The Pt tolerated his therapy program well. He is progressing nicely with his scapular and shoulder strengthening exercises. He is demonstrating improved postural awareness with his exercises.    Patient will continue to benefit from skilled PT services to modify and progress therapeutic interventions, address functional mobility deficits, address ROM deficits, address strength deficits, analyze and address soft tissue restrictions, analyze and cue movement patterns, analyze and modify body mechanics/ergonomics, assess and modify postural abnormalities and instruct in home and community integration to attain remaining goals. []  See Plan of Care  []  See progress note/recertification  []  See Discharge Summary         Progress towards goals / Updated goals:  Short Term Goals: To be accomplished in 6 treatments:  1. The Pt will be independent and compliant with their HEP- progressing  2. The Pt will report a 50% reduction in their pain with ADLs- progressing  Long Term Goals: To be accomplished in 16 treatments:  1. The Pt will score the MCII on his FOTO survey demonstrating improved overall function (34 to 55 points)- progressing  2. The Pt will have >/= 50 degrees of cervical rotation bilaterally to allow the Pt to be able to look over his shoulders with less difficulty- progressing  3. The Pt will be able to walk >/= 15 minutes before any neck pain starts to allow the Pt to be able to walk recreationally with less discomfort- progressing  4.  The Pt will be able to golf >/= 4 holes with 0-2/10 pain to allow the pt to be able to golf with less discomfort- progressing    PLAN  [x]  Upgrade activities as tolerated     [x]  Continue plan of care  [x]  Update interventions per flow sheet       []  Discharge due to:_  []  Other:_      Mundo Gillespie, PT 5/11/2021

## 2021-05-18 ENCOUNTER — HOSPITAL ENCOUNTER (OUTPATIENT)
Dept: PHYSICAL THERAPY | Age: 66
Discharge: HOME | End: 2021-05-18
Payer: MEDICARE

## 2021-05-18 PROCEDURE — 97110 THERAPEUTIC EXERCISES: CPT

## 2021-05-18 NOTE — PROGRESS NOTES
[facility]. Physical Therapy  [address] [place]  Phone: [phone] Fax: [phone]    Progress Note    Name: Letty Bravo   : 1955   MD: Cee Torres MD       Treatment Diagnosis: Tremor, unspecified [R25.1]  Other spondylosis with radiculopathy, cervical region [M47.22]  Weakness [R53.1]  Start of Care: 21    Visits from Start of Care: 5  Missed Visits: 0    Summary of Care: The Pt has been seen for 5 outpatient physical therapy sessions with L neck and shoulder pain. The Pt's therapy program has focused on improving his cervical ROM, improving his thoracic spinal mobility, reducing the turgor and restriction in his neck and scapular musculature, improving his scapular strength and stability, improving his postural strength and awareness, and improving his activity tolerance and endurance via therapeutic exercises and manual therapy techniques. The Pt has progressed very well and reports that he is no longer experiencing pain, but describes it more as a \"discomfort\". The \"discomfort\" is along the L side of the neck/top of the shoulder. He has been able to find stretches that stretch the tight muscles and he feels much looser. He is having less discomfort with his ADLs. He has been able to increase to playing 18 holes of golf 3x/wk and is pushing his cart each time; he is no longer having pain after playing golf. He has been able to incorporate his exercises and stretches into his routine and feels like it has helped tremendously. Overall, he believes that he is 80% improved since beginning therapy. Recommend continued PT for an additional 3 sessions to help progress his remaining impairments to allow the Pt to safely return to his PLOF with less pain or discomfort.     Other Objective/Functional Measures: FOTO 67/100 (34/100 at initial evaluation)  Cervical AROM:  Flexion- 51  Extension- 25  Rotation: Right- 46, Left- 45     Progress towards goals / Updated goals:  Short Term Goals: To be accomplished in 6 treatments:  1. The Pt will be independent and compliant with their HEP- met  2. The Pt will report a 50% reduction in their pain with ADLs- met  Long Term Goals: To be accomplished in 16 treatments:  1. The Pt will score the MCII on his FOTO survey demonstrating improved overall function (34 to 55 points)- met FOTO 67/100  2. The Pt will have >/= 50 degrees of cervical rotation bilaterally to allow the Pt to be able to look over his shoulders with less difficulty- progressing  3. The Pt will be able to walk >/= 15 minutes before any neck pain starts to allow the Pt to be able to walk recreationally with less discomfort- met  4.  The Pt will be able to golf >/= 4 holes with 0-2/10 pain to allow the pt to be able to golf with less discomfort- met    Kathi Ventura, PT 5/18/2021

## 2021-05-18 NOTE — PROGRESS NOTES
PT DAILY TREATMENT NOTE - [facility] 2-15    Patient Name: Zamzam Raymundo  Date:2021  : 1955  [x]  Patient  Verified  Payor: [place] MEDICARE / Plan: VA MEDICARE PART A & B / Product Type: Medicare /    In time: 2:35 PM  Out time: 3:35 PM  Total Treatment Time (min): 60 minutes  Total Timed Codes (min): 60 minutes  1:1 Treatment Time ([facility] only): 56 minutes   Visit #:  5    Treatment Area: Tremor, unspecified [R25.1]  Other spondylosis with radiculopathy, cervical region [M47.22]  Weakness [R53.1]    SUBJECTIVE  Pain Level (0-10 scale): 1/10  Any medication changes, allergies to medications, adverse drug reactions, diagnosis change, or new procedure performed?: [x] No    [] Yes (see summary sheet for update)  Subjective functional status/changes:   [] No changes reported  The Pt reports that he is no longer experiencing pain, but describes it more as a \"discomfort\". The \"discomfort\" is along the L side of the neck/top of the shoulder. He has been able to find stretches that stretch the tight muscles and he feels much looser. He is having less discomfort with his ADLs. He has been able to increase to playing 18 holes of golf 3x/wk and is pushing his cart each time; he is no longer having pain after playing golf. He has been able to incorporate his exercises and stretches into his routine and feels like it has helped tremendously. Overall, he believes that he is 80% improved since beginning therapy. OBJECTIVE    56 min Therapeutic Exercise:  [x] See flow sheet :   Rationale: increase ROM, increase strength and increase proprioception to improve the patients ability to perform ADLs and recreational activities with less pain or discomfort.     With   [x] TE   [] TA   [] Neuro   [] SC   [] other: Patient Education: [x] Review HEP    [] Progressed/Changed HEP based on:   [] positioning   [] body mechanics   [] transfers   [] heat/ice application    [] other:      Other Objective/Functional Measures: FOTO 67/100 (34/100 at initial evaluation)  Cervical AROM:  Flexion- 51  Extension- 25  Rotation: Right- 46, Left- 45     Pain Level (0-10 scale) post treatment: 1/10    ASSESSMENT/Changes in Function:     Patient will continue to benefit from skilled PT services to modify and progress therapeutic interventions, address functional mobility deficits, address ROM deficits, address strength deficits, analyze and address soft tissue restrictions, analyze and cue movement patterns, analyze and modify body mechanics/ergonomics, assess and modify postural abnormalities and instruct in home and community integration to attain remaining goals. []  See Plan of Care  [x]  See progress note/recertification  []  See Discharge Summary         Progress towards goals / Updated goals:  Short Term Goals: To be accomplished in 6 treatments:  1. The Pt will be independent and compliant with their HEP- met  2. The Pt will report a 50% reduction in their pain with ADLs- met  Long Term Goals: To be accomplished in 16 treatments:  1. The Pt will score the MCII on his FOTO survey demonstrating improved overall function (34 to 55 points)- met FOTO 67/100  2. The Pt will have >/= 50 degrees of cervical rotation bilaterally to allow the Pt to be able to look over his shoulders with less difficulty- progressing  3. The Pt will be able to walk >/= 15 minutes before any neck pain starts to allow the Pt to be able to walk recreationally with less discomfort- met  4.  The Pt will be able to golf >/= 4 holes with 0-2/10 pain to allow the pt to be able to golf with less discomfort- met    PLAN  [x]  Upgrade activities as tolerated     [x]  Continue plan of care  [x]  Update interventions per flow sheet       []  Discharge due to:_  []  Other:_      Nena De Souza, PT 5/18/2021

## 2021-05-25 ENCOUNTER — HOSPITAL ENCOUNTER (OUTPATIENT)
Dept: PHYSICAL THERAPY | Age: 66
Discharge: HOME | End: 2021-05-25
Payer: MEDICARE

## 2021-05-25 PROCEDURE — 97110 THERAPEUTIC EXERCISES: CPT

## 2021-05-25 NOTE — PROGRESS NOTES
PT DAILY TREATMENT NOTE - [facility] 2-15    Patient Name: Jessica Aguilera  Date:2021  : 1955  [x]  Patient  Verified  Payor: [place] MEDICARE / Plan: VA MEDICARE PART A & B / Product Type: Medicare /    In time: 2:44 PM  Out time: 3:29 PM  Total Treatment Time (min): 45 minutes  Total Timed Codes (min): 45 minutes  1:1 Treatment Time ( only): 41 minutes   Visit #:  6    Treatment Area: Tremor, unspecified [R25.1]  Other spondylosis with radiculopathy, cervical region [M47.22]  Weakness [R53.1]    SUBJECTIVE  Pain Level (0-10 scale): 1/10  Any medication changes, allergies to medications, adverse drug reactions, diagnosis change, or new procedure performed?: [x] No    [] Yes (see summary sheet for update)  Subjective functional status/changes:   [] No changes reported  The Pt reports that he has continued to be able play 19 holes of golf 3x/wk without pain, but does have some stiffness. He reports feeling much looser overall and the tightness along the L-side of his neck is much improved. OBJECTIVE    45 min Therapeutic Exercise:  [x] See flow sheet :   Rationale: increase ROM, increase strength and increase proprioception to improve the patients ability to perform ADLs and recreational activities with less pain or difficulty    With   [x] TE   [] TA   [] Neuro   [] SC   [] other: Patient Education: [x] Review HEP    [] Progressed/Changed HEP based on:   [] positioning   [] body mechanics   [] transfers   [] heat/ice application    [] other:      Other Objective/Functional Measures: None noted     Pain Level (0-10 scale) post treatment: 1/10    ASSESSMENT/Changes in Function:   The Pt required mild verbal cues to correct the form and technique of his newer exercises to prevent compensations and made the corrections nicely. He reports appropriate muscle fatigue with the exercises.   Patient will continue to benefit from skilled PT services to modify and progress therapeutic interventions, address functional mobility deficits, address ROM deficits, address strength deficits, analyze and address soft tissue restrictions, analyze and cue movement patterns, analyze and modify body mechanics/ergonomics, assess and modify postural abnormalities and instruct in home and community integration to attain remaining goals. []  See Plan of Care  []  See progress note/recertification  []  See Discharge Summary         Progress towards goals / Updated goals:  Short Term Goals: To be accomplished in 6 treatments:  1. The Pt will be independent and compliant with their HEP- met  2. The Pt will report a 50% reduction in their pain with ADLs- met  Long Term Goals: To be accomplished in 16 treatments:  1. The Pt will score the MCII on his FOTO survey demonstrating improved overall function (34 to 55 points)- met FOTO 67/100  2. The Pt will have >/= 50 degrees of cervical rotation bilaterally to allow the Pt to be able to look over his shoulders with less difficulty- progressing  3. The Pt will be able to walk >/= 15 minutes before any neck pain starts to allow the Pt to be able to walk recreationally with less discomfort- met  4.  The Pt will be able to golf >/= 4 holes with 0-2/10 pain to allow the pt to be able to golf with less discomfort- met    PLAN  [x]  Upgrade activities as tolerated     [x]  Continue plan of care  [x]  Update interventions per flow sheet       []  Discharge due to:_  []  Other:_      Parker Meehan, PT 5/25/2021

## 2021-06-01 ENCOUNTER — HOSPITAL ENCOUNTER (OUTPATIENT)
Dept: PHYSICAL THERAPY | Age: 66
Discharge: HOME | End: 2021-06-01
Payer: MEDICARE

## 2021-06-01 PROCEDURE — 97110 THERAPEUTIC EXERCISES: CPT

## 2021-06-01 NOTE — PROGRESS NOTES
PT DAILY TREATMENT NOTE - [facility] 2-15    Patient Name: Estefanía Reynolds  Date:2021  : 1955  [x]  Patient  Verified  [place]or: [place] MED[place] / Plan: [place] MEDICARE PART A & B / Product Type: Medicare /    In time: 2:45 PM  Out time: 3:35 PM  Total Treatment Time (min): 50 minutes  Total Timed Codes (min): 50 minutes  1:1 Treatment Time ( only): 46 minutes   Visit #:  7    Treatment Area: Tremor, unspecified [R25.1]  Other spondylosis with radiculopathy, cervical region [M47.22]  Weakness [R53.1]    SUBJECTIVE  Pain Level (0-10 scale): 2/10  Any medication changes, allergies to medications, adverse drug reactions, diagnosis change, or new procedure performed?: [x] No    [] Yes (see summary sheet for update)  Subjective functional status/changes:   [] No changes reported  The Pt reports that he has been experiencing some increased pain since last session on the L top of the shoulder. He is unsure if it is related to the original pain or Parkinson's pain. He played 18 holes of golf this morning and had no pain, but since is feeling some soreness come in. OBJECTIVE    50 min Therapeutic Exercise:  [x] See flow sheet :   Rationale: increase ROM, increase strength and increase proprioception to improve the patients ability to perform ADLs with less pain or discomfort. With   [x] TE   [] TA   [] Neuro   [] SC   [] other: Patient Education: [x] Review HEP    [] Progressed/Changed HEP based on:   [] positioning   [] body mechanics   [] transfers   [] heat/ice application    [] other:      Other Objective/Functional Measures: None noted     Pain Level (0-10 scale) post treatment: 0/10    ASSESSMENT/Changes in Function:   The Pt tolerated the more challenging and functional exercises well. He is progressing nicely towards his therapeutic goals and anticipate discharge after next PT session.   Patient will continue to benefit from skilled PT services to modify and progress therapeutic interventions, address functional mobility deficits, address ROM deficits, address strength deficits, analyze and address soft tissue restrictions, analyze and cue movement patterns, analyze and modify body mechanics/ergonomics, assess and modify postural abnormalities and instruct in home and community integration to attain remaining goals. []  See Plan of Care  []  See progress note/recertification  []  See Discharge Summary         Progress towards goals / Updated goals:  Short Term Goals: To be accomplished in 6 treatments:  1. The Pt will be independent and compliant with their HEP- met  2. The Pt will report a 50% reduction in their pain with ADLs- met  Long Term Goals: To be accomplished in 16 treatments:  1. The Pt will score the MCII on his FOTO survey demonstrating improved overall function (34 to 55 points)- met FOTO 67/100  2. The Pt will have >/= 50 degrees of cervical rotation bilaterally to allow the Pt to be able to look over his shoulders with less difficulty- progressing  3. The Pt will be able to walk >/= 15 minutes before any neck pain starts to allow the Pt to be able to walk recreationally with less discomfort- met  4.  The Pt will be able to golf >/= 4 holes with 0-2/10 pain to allow the pt to be able to golf with less discomfort- met    PLAN  [x]  Upgrade activities as tolerated     [x]  Continue plan of care  [x]  Update interventions per flow sheet       []  Discharge due to:_  []  Other:_      Paco Childress, PT 6/1/2021

## 2021-06-08 ENCOUNTER — HOSPITAL ENCOUNTER (OUTPATIENT)
Dept: PHYSICAL THERAPY | Age: 66
Discharge: HOME | End: 2021-06-08
Payer: MEDICARE

## 2021-06-08 PROCEDURE — 97110 THERAPEUTIC EXERCISES: CPT

## 2021-06-08 NOTE — PROGRESS NOTES
PT DAILY TREATMENT NOTE - [facility] 2-15    Patient Name: Zamzam Raymundo  Date:2021  : 1955  [x]  Patient  Verified  Payor: [place] MEDICARE / Plan: [place] MEDICARE PART A & B / Product Type: Medicare /    In time: 2:43 PM  Out time: 3:32 PM  Total Treatment Time (min): 50 minutes  Total Timed Codes (min): 50 minutes  1:1 Treatment Time ( only): 46 minutes   Visit #:  8    Treatment Area: Tremor, unspecified [R25.1]  Other spondylosis with radiculopathy, cervical region [M47.22]  Weakness [R53.1]    SUBJECTIVE  Pain Level (0-10 scale): 0/10  Any medication changes, allergies to medications, adverse drug reactions, diagnosis change, or new procedure performed?: [x] No    [] Yes (see summary sheet for update)  Subjective functional status/changes:   [] No changes reported  The Pt reports that the pain that he was experiencing last week dissipated quickly and he has returned to not having any pain or tightness. He has continued to golf 18 holes 3x/wk walking without increased pain. He is able to do all of his ADLs with less pain. He is able to buckle his belt and put on his coat without pain or limitation. Overall, he believes that he is 100% improved since beginning therapy. OBJECTIVE    50 min Therapeutic Exercise:  [x] See flow sheet :   Rationale: increase ROM, increase strength and increase proprioception to improve the patients ability to perform ADLs with less pain or discomfort.     With   [x] TE   [] TA   [] Neuro   [] SC   [x] other: Patient Education: [x] Review HEP    [] Progressed/Changed HEP based on:   [] positioning   [] body mechanics   [] transfers   [] heat/ice application    [x] other: Pt educated how to safely progress his HEP independently     Other Objective/Functional Measures:  FOTO 82/100 (34/100 at initial evaluation)   Cervical Rotation: Right- 63, Left- 58    Pain Level (0-10 scale) post treatment: 0/10    ASSESSMENT/Changes in Function:     []  See Plan of Care  []  See progress note/recertification  [x]  See Discharge Summary         Progress towards goals / Updated goals:  Short Term Goals: To be accomplished in 6 treatments:  1. The Pt will be independent and compliant with their HEP- met  2. The Pt will report a 50% reduction in their pain with ADLs- met  Long Term Goals: To be accomplished in 16 treatments:  1. The Pt will score the MCII on his FOTO survey demonstrating improved overall function (34 to 55 points)- met FOTO 82/100  2. The Pt will have >/= 50 degrees of cervical rotation bilaterally to allow the Pt to be able to look over his shoulders with less difficulty- met  3. The Pt will be able to walk >/= 15 minutes before any neck pain starts to allow the Pt to be able to walk recreationally with less discomfort- met  4.  The Pt will be able to golf >/= 4 holes with 0-2/10 pain to allow the pt to be able to golf with less discomfort- met    PLAN  []  Upgrade activities as tolerated     []  Continue plan of care  []  Update interventions per flow sheet       [x]  Discharge due to: Pt has met all therapeutic goals  []  Other:_      Kathi Ventura, PT 6/8/2021

## 2021-06-08 NOTE — ANCILLARY DISCHARGE INSTRUCTIONS
[facility]  [address] [place] [facility]  Phone: [phone] Fax: [phone]    Discharge Summary 2-15    Patient name: Darlin Tamayo  : 1955  Provider#: [id number]  Referral source: Becky Mcgill MD      Medical/Treatment Diagnosis: Tremor, unspecified [R25.1]  Other spondylosis with radiculopathy, cervical region [M47.22]  Weakness [R53.1]     Prior Hospitalization: see medical history     Comorbidities: See Plan of Care  Prior Level of Function: See Plan of Care  Medications: Verified on Patient Summary List    Start of Care: 21      Onset Date: Chronic   Visits from Start of Care: 8     Missed Visits: 0  Reporting Period : 21 to 21    Assessment/Summary of care: The Pt was seen for 8 outpatient physical therapy sessions with L neck and shoulder pain. The Pt's therapy program focused on improving his cervical ROM, improving his thoracic spinal mobility, reducing the turgor and restriction in his neck and scapular musculature, improving his scapular strength and stability, improving his postural strength and awareness, and improving his activity tolerance and endurance via therapeutic exercises and manual therapy techniques. The Pt has been able to return to all ADLs and recreational activities without any pain. He is now able to walk 18 holes of golf 3x/wk without any pain or symptoms. He has been able to incorporate his therapy program into his already established yoga practice so he is compliant with his HEP and completes it daily. He denies any functional limitations and believes that he is 100% improved since beginning therapy. At this time, it is believed that the Pt has reached maximum benefit from skilled PT and will continue to progress well independently. The Pt was educated how to progress his HEP and voiced understanding.   The Pt is discharged from skilled PT and will contact his referring provider with any further questions or concerns. Thank you for this referral.    Progress towards goals / Updated goals:  Short Term Goals: To be accomplished in 6 treatments:  1. The Pt will be independent and compliant with their HEP- met  2. The Pt will report a 50% reduction in their pain with ADLs- met  Long Term Goals: To be accomplished in 16 treatments:  1. The Pt will score the MCII on his FOTO survey demonstrating improved overall function (34 to 55 points)- met FOTO 82/100  2. The Pt will have >/= 50 degrees of cervical rotation bilaterally to allow the Pt to be able to look over his shoulders with less difficulty- met  3. The Pt will be able to walk >/= 15 minutes before any neck pain starts to allow the Pt to be able to walk recreationally with less discomfort- met  4.  The Pt will be able to golf >/= 4 holes with 0-2/10 pain to allow the pt to be able to golf with less discomfort- met    RECOMMENDATIONS:  [x]Discontinue therapy: [x]Patient has reached or is progressing toward set goals     []Patient is non-compliant or has abdicated     []Due to lack of appreciable progress towards set goals     []Dez Li, PT 6/8/2021

## 2021-10-26 ENCOUNTER — OFFICE VISIT (OUTPATIENT)
Dept: NEUROLOGY | Age: 66
End: 2021-10-26
Payer: MEDICARE

## 2021-10-26 VITALS
HEART RATE: 97 BPM | RESPIRATION RATE: 18 BRPM | TEMPERATURE: 98 F | SYSTOLIC BLOOD PRESSURE: 126 MMHG | DIASTOLIC BLOOD PRESSURE: 84 MMHG | WEIGHT: 213 LBS | HEIGHT: 75 IN | BODY MASS INDEX: 26.49 KG/M2 | OXYGEN SATURATION: 98 %

## 2021-10-26 DIAGNOSIS — G20 PARKINSON'S DISEASE (TREMOR, STIFFNESS, SLOW MOTION, UNSTABLE POSTURE) (HCC): ICD-10-CM

## 2021-10-26 DIAGNOSIS — M47.22 CERVICAL RADICULOPATHY DUE TO DEGENERATIVE JOINT DISEASE OF SPINE: Primary | ICD-10-CM

## 2021-10-26 DIAGNOSIS — G25.0 BENIGN ESSENTIAL TREMOR SYNDROME: ICD-10-CM

## 2021-10-26 PROCEDURE — G8419 CALC BMI OUT NRM PARAM NOF/U: HCPCS | Performed by: PSYCHIATRY & NEUROLOGY

## 2021-10-26 PROCEDURE — G8536 NO DOC ELDER MAL SCRN: HCPCS | Performed by: PSYCHIATRY & NEUROLOGY

## 2021-10-26 PROCEDURE — G8510 SCR DEP NEG, NO PLAN REQD: HCPCS | Performed by: PSYCHIATRY & NEUROLOGY

## 2021-10-26 PROCEDURE — 3017F COLORECTAL CA SCREEN DOC REV: CPT | Performed by: PSYCHIATRY & NEUROLOGY

## 2021-10-26 PROCEDURE — G8427 DOCREV CUR MEDS BY ELIG CLIN: HCPCS | Performed by: PSYCHIATRY & NEUROLOGY

## 2021-10-26 PROCEDURE — 99214 OFFICE O/P EST MOD 30 MIN: CPT | Performed by: PSYCHIATRY & NEUROLOGY

## 2021-10-26 PROCEDURE — 1101F PT FALLS ASSESS-DOCD LE1/YR: CPT | Performed by: PSYCHIATRY & NEUROLOGY

## 2021-10-26 RX ORDER — SULFAMETHOXAZOLE AND TRIMETHOPRIM 400; 80 MG/1; MG/1
1 TABLET ORAL 2 TIMES DAILY
COMMUNITY
End: 2022-02-28 | Stop reason: ALTCHOICE

## 2021-10-26 RX ORDER — TIZANIDINE 4 MG/1
4 TABLET ORAL
Qty: 100 TABLET | Refills: 11 | Status: SHIPPED | OUTPATIENT
Start: 2021-10-26

## 2021-10-26 RX ORDER — PRAMIPEXOLE DIHYDROCHLORIDE 0.25 MG/1
0.25 TABLET ORAL 3 TIMES DAILY
Qty: 270 TABLET | Refills: 5 | Status: SHIPPED | OUTPATIENT
Start: 2021-10-26 | End: 2021-11-30 | Stop reason: SDUPTHER

## 2021-10-26 RX ORDER — COLCHICINE 0.6 MG/1
0.6 CAPSULE ORAL
COMMUNITY
Start: 2021-08-05

## 2021-10-26 NOTE — PROGRESS NOTES
Consult  REFERRED BY:  Kassy Nascimento MD    CHIEF COMPLAINT: Increased wearing off phenomena and slowness of movement    Subjective:     Walt Rojas is a 78 y.o. right-handed  male seen at the request of Dr. Garcia Acosta for evaluation of neurologic complaints including wearing off phenomena and increased tremor and feeling that he needs more medication, despite taking his Sinemet 4 times a day at 6 AM noon 6 PM and midnight, which seems to help control his symptoms, and the fact that he did not seem to get that much better taking Comtan 300 mg 4 times a day. Pulse is very costly to him and because almost $500 a month even though it is generic. I told him to stop that medication we will try him on a dopamine agonist Mirapex 0.25 mg 3 times a day to see if that will be more effective and provide him with more on time and less off time. The problem is that he has a fair amount of off time and it is gradually increasing and gets embarrassing especially socially when he is out and suddenly freezes up particular when he is under more stress and tension. In addition he has a new problem of increasing pain radiating to both arms, which she had a cervical spine x-ray done that showed moderate degenerative disease of the cervical spine at multiple levels, he was given physical therapy last time and did better, so we told him to continue his exercise program that they gave him, we will give him a muscle relaxant to see if that helps him. We will give him tizanidine 4 mg take 1/2 tablet to 1 tablet 3 times a day as needed. He was advised of the side effect as far as sedation and drowsiness. He did not like the Eldepryl, and could not afford the Azilect, and says he has to have a medication that is inexpensive. Other options may be Nourianz, but I think he has to fail his other medicines first. He also has essential tremor and currently takes Mysoline 50 mg a day that doesn't seem to be too out of control. He is trying to exercise regularly, and is able to move fairly well except when he has the off phenomena. Patient's tremor is accentuated when he has to self catheterize himself, which he does 4 times a day, because of neurogenic bladder that occurred after he had abdominal abscess requiring surgery in 2016. The patient noted the tremors more when he tries to do things that responded to Mysoline he became depressed and now is on a half a tablet tolerating that better. He took Lexapro for a while because of depression. He has a family history of tremors in his paternal aunt tremors and and one brother also was chronically abusing alcohol. 1 maternal aunt had Parkinson's disease. He notices that his voice is softer, the tremor sometimes will occur when he is at rest worse in the right arm compared to the left arm, and he feels that he is moving slower and complains of generalized weakness. He tries exercise regularly, and does not have the endurance he used to. When he goes to bed he has to catheterize himself about 4 hours later he can never go back to bed. He speaks with a soft voice and may have smaller handwriting. Any thing that gets him upset makes his tremor worse. He has had recent Y29 level and folic acid levels that were normal, but he does have mild anemia with macrocytosis. He had normal vitamin D levels recently, normal thyroid, normal metabolic screen except for some cholesterol problems. He takes some vitamins on a regular basis. He has no unusual neck or thoracic pain or lumbar pain. His cranial nerves II through XII are otherwise intact has no difficulty speaking or swallowing other than the soft voice. He has had no falls or injuries and no other toxin exposure or neuroleptic exposure. Patient also had a normal carotid Doppler study just recently. He denies any history of stroke or TIA.     Past Medical History:   Diagnosis Date    Arthritis     gouty arthritis in feet & ankles    Gout     HTN (hypertension)       Past Surgical History:   Procedure Laterality Date    COLONOSCOPY N/A 12/28/2016    COLONOS[facility] Dirk [facility]z [facility]    HX COLONOSCOPY  2010    HX GI  12/29/2016    LAPAROSCOPIC SIGMOID COLECTOMY/ CYSTOSCOPY/ BILATERAL URETERAL STENT PLACEMENT AND REMOVAL    HX ORTHOPAEDIC      Lumbar surgery age 24    HX UROLOGICAL      bladder     Family History   Problem Relation Age of Onset    Other Mother         old age   Lori Curl Heart Disease Father     Diabetes Father     Heart Disease Brother     Alcohol abuse Brother     Other Brother         pancreatitis    Anxiety Child     Depression Child     Attention Deficit Disorder Child       Social History     Tobacco Use    Smoking status: Never Smoker    Smokeless tobacco: Never Used   Substance Use Topics    Alcohol use: No         Current Outpatient Medications:     colchicine (MITIGARE) 0.6 mg capsule, , Disp: , Rfl:     trimethoprim-sulfamethoxazole (BACTRIM, SEPTRA)  mg per tablet, Take 1 Tablet by mouth two (2) times a day. Take as doctor prescribes for UTI, Disp: , Rfl:     pramipexole (MIRAPEX) 0.25 mg tablet, Take 1 Tablet by mouth three (3) times daily. , Disp: 270 Tablet, Rfl: 5    tiZANidine (ZANAFLEX) 4 mg tablet, Take 1 Tablet by mouth three (3) times daily as needed for Muscle Spasm(s). , Disp: 100 Tablet, Rfl: 11    entacapone (Comtan) 200 mg tablet, Take 1 Tab by mouth four (4) times daily. , Disp: 360 Tab, Rfl: 5    carbidopa-levodopa (Sinemet)  mg per tablet, Take 1 Tab by mouth four (4) times daily. Indications: a type of movement disorder called parkinsonism, restless legs syndrome, an extreme discomfort in the calf muscles when sitting or lying down, Disp: 360 Tab, Rfl: 4    allopurinoL (ZYLOPRIM) 100 mg tablet, Take  by mouth daily. , Disp: , Rfl:     cholecalciferol (VITAMIN D3) 25 mcg (1,000 unit) cap, Take  by mouth daily. , Disp: , Rfl:         No Known Allergies   MRI Results (most recent):  Results from Orders Only encounter on 08/17/20    MRI BRAIN W WO CONT    Narrative  EXAM:  MRI BRAIN W WO CONT    INDICATION:    tremors    COMPARISON:  None. CONTRAST: 20 ml Dotarem. TECHNIQUE:  Multiplanar multisequence acquisition without and with contrast of the brain. FINDINGS:  Stat diffusion imaging does not show acute ischemic changes. Moderate atrophy, mild nonspecific white matter changes. No extra-axial fluid collection, hemorrhage or shift. Flow voids in major vessels at the base of the brain are present. No enhancing lesion no masses. Impression  IMPRESSION: Atrophy and white matter disease. No acute findings no mass. Results from Orders Only encounter on 08/17/20    MRI BRAIN W WO CONT    Narrative  EXAM:  MRI BRAIN W WO CONT    INDICATION:    tremors    COMPARISON:  None. CONTRAST: 20 ml Dotarem. TECHNIQUE:  Multiplanar multisequence acquisition without and with contrast of the brain. FINDINGS:  Stat diffusion imaging does not show acute ischemic changes. Moderate atrophy, mild nonspecific white matter changes. No extra-axial fluid collection, hemorrhage or shift. Flow voids in major vessels at the base of the brain are present. No enhancing lesion no masses. Impression  IMPRESSION: Atrophy and white matter disease. No acute findings no mass.     Review of Systems:  A comprehensive review of systems was negative except for: Constitutional: positive for fatigue, malaise and weight loss  Genitourinary: positive for urinary incontinence  Musculoskeletal: positive for myalgias, arthralgias, stiff joints and muscle weakness  Neurological: positive for gait problems, tremor and weakness  Behvioral/Psych: positive for anxiety and depression   Vitals:    10/26/21 1132   BP: 126/84   Pulse: 97   Resp: 18   Temp: 98 °F (36.7 °C)   TempSrc: Temporal   SpO2: 98%   Weight: 213 lb (96.6 kg)   Height: 6' 3\" (1.905 m)     Objective: I      NEUROLOGICAL EXAM:    Appearance: The patient is well developed, well nourished, provides a coherent history and is in no acute distress. Mental Status: Oriented to time, place and person, and the president, cognitive function is normal and speech is fluent and no aphasia or dysarthria. Mood and affect appropriate. Cranial Nerves:   Intact visual fields. Fundi are benign, disc are flat, no lesions seen on funduscopy. TARAS, EOM's full, no nystagmus, no ptosis. Facial sensation is normal. Corneal reflexes are not tested. Facial movement is symmetric. Hearing is normal bilaterally. Palate is midline with normal sternocleidomastoid and trapezius muscles are normal. Tongue is midline. Neck without meningismus or bruits  Temporal arteries are not tender or enlarged  TMJ areas are not tender on palpation   Motor:  5/5 strength in upper and lower proximal and distal muscles. Normal bulk and tone throughout except slight increased muscle tone and rigidity and bradykinesia in both upper extremities with reinforcement. No fasciculations. Rapid alternating movement is symmetric and intact bilaterally   Reflexes:   Deep tendon reflexes 2+/4 and symmetrical.  No babinski or clonus present   Sensory:   Normal to touch, pinprick and vibration and temperature intact except slight decrease in both feet. DSS is intact   Gait:  Normal gait for patient's age, except that the patient may move a little slowly and has decreased arm swing on the right. .   Tremor:    Mild bilateral intention tremor noted in both upper extremities, and a mild to moderate somewhat fluctuating rest tremor and static tremor in the right upper extremity and mild involvement in the left upper extremity. Patient has mild micrographia and a slightly soft voice. .   Cerebellar:  No abnormal cerebellar signs present on Romberg and tandem testing and finger-nose-finger exam shows the bilateral intention tremor.    Neurovascular:  Normal heart sounds and regular rhythm, peripheral pulses decreased, and no carotid bruits. Assessment:       ICD-10-CM ICD-9-CM    1. Cervical radiculopathy due to degenerative joint disease of spine  M47.22 721.0 pramipexole (MIRAPEX) 0.25 mg tablet   2. Parkinson's disease (tremor, stiffness, slow motion, unstable posture) ([facility])  G20 332.0 pramipexole (MIRAPEX) 0.25 mg tablet   3. Benign essential tremor syndrome  G25.0 333.1 pramipexole (MIRAPEX) 0.25 mg tablet     Active Problems:    * No active hospital problems. *      Plan:     New problem of patient having increased off spells, despite taking his medication of Sinemet 25/100 mg and Comtan 300 mg faithfully every 6 hours at 6 AM, noon, 6 PM and midnight, and patient intolerant of Eldepryl, and could not afford Azilect, and patient feels he needs new medication or needs to increase his Sinemet  We will try Mirapex 3 times a day with his doses of Sinemet,   He is advised of the side effects as far as increased dyskinesia or GI side effects or any side effect to call us immediately. He has essential tremor that seems stable on Mysoline 50 mg a day. For his sensation of increasing neck pain and radicular pain into both arms, and muscle tension, he is to get back to doing his neck exercises we will give him some Zanaflex one half of a 4 mg tablet up to 1 4 mg tablet taking it 3 times a day as needed. For his weakness we checked a CPK CAMILLE and sed rate to rule out neuromuscular disease, and an anti-neuronal antibody titer and they were all unremarkable. He also has essential tremor that is mild in both upper extremities he is to continue with  Mysoline since the tremor of essential type really seems mild at this time.   For his macrocytosis he will get an antiparietal cell antibody titer just to make sure he does not have pernicious anemia and that was okay  For his chronic insomnia recommend he try melatonin 5 to 10 mg to see if that helps first before resorting to medications. He is to eat a good diet, watch his weight and continue regular exercise program.  We advised to do a combination of cardiovascular strength strengthening exercise, and stretching and balance and flexibility  Patient may need an EMG to evaluate his weakness, we will await the above-mentioned test first.  Recommended a One-A-Day vitamin and vitamin D, and the patient is to continue a regular exercise program, which we advised him can slow the progression of the disease down. Patient will check my chart for results of his test, 1 hour for the patient going over his history, reviewing his labs, looking at his diagnosis and prognosis, and diagnostic testing needed and patient agrees with plans as outlined above. 35 minutes spent with the patient today, discussing his therapeutic options, and other options in the future we discussed with him with dopamine agonist or extended release Comtan or Stalevo or adenosine blockers  He will call if any problem in the interim otherwise we will see him in 3 months time.           Signed By: Marjan Christensen MD     October 26, 2021       CC: Brandon Gastelum MD  FAX: [phone]

## 2021-10-26 NOTE — PROGRESS NOTES
Identified pt with two pt identifiers(name and ). Reviewed record in preparation for visit and have obtained necessary documentation. Chief Complaint   Patient presents with    Follow-up     6 month follow up     Tremors     parkinsons disease      Pt reports that overall, no improvements but has tremors in the arms and neck. Patient reports radiating pain from the neck to the arms. Pt reports \"physically doing well\" and has good reports from other doctors and cardiology. Vitals:    10/26/21 1132   BP: 126/84   Pulse: 97   Resp: 18   Temp: 98 °F (36.7 °C)   TempSrc: Temporal   SpO2: 98%   Weight: 213 lb (96.6 kg)   Height: 6' 3\" (1.905 m)   PainSc:   0 - No pain       Health Maintenance Review: Patient reminded of \"due or due soon\" health maintenance. I have asked the patient to contact his/her primary care provider (PCP) for follow-up on his/her health maintenance. Coordination of Care Questionnaire:  :   1) Have you been to an emergency room, urgent care, or hospitalized since your last visit? If yes, where when, and reason for visit? no       2. Have seen or consulted any other health care provider since your last visit? If yes, where when, and reason for visit? NO      Patient is accompanied by self I have received verbal consent from Lorenzo Yun to discuss any/all medical information while they are present in the room.

## 2021-10-26 NOTE — LETTER
10/26/2021    Patient: Frida Coronado   YOB: 1955   Date of Visit: 10/26/2021     Tika Samuel MD  [address] [place] [address]  [address] [phone]  Via Fax: [phone]    Dear Tika Samuel MD,      Thank you for referring Mr. Mart Hawkins to [address] [place] [address] for evaluation. My notes for this consultation are attached. Identified pt with two pt identifiers(name and ). Reviewed record in preparation for visit and have obtained necessary documentation. Chief Complaint   Patient presents with    Follow-up     6 month follow up     Tremors     parkinsons disease      Pt reports that overall, no improvements but has tremors in the arms and neck. Patient reports radiating pain from the neck to the arms. Pt reports \"physically doing well\" and has good reports from other doctors and cardiology. Vitals:    10/26/21 1132   BP: 126/84   Pulse: 97   Resp: 18   Temp: 98 °F (36.7 °C)   TempSrc: Temporal   SpO2: 98%   Weight: 213 lb (96.6 kg)   Height: 6' 3\" (1.905 m)   PainSc:   0 - No pain       Health Maintenance Review: Patient reminded of \"due or due soon\" health maintenance. I have asked the patient to contact his/her primary care provider (PCP) for follow-up on his/her health maintenance. Coordination of Care Questionnaire:  :   1) Have you been to an emergency room, urgent care, or hospitalized since your last visit? If yes, where when, and reason for visit? no       2. Have seen or consulted any other health care provider since your last visit? If yes, where when, and reason for visit? NO      Patient is accompanied by self I have received verbal consent from Frida Coronado to discuss any/all medical information while they are present in the room.     Consult  REFERRED BY:  Jose Anderson MD    CHIEF COMPLAINT: Increased wearing off phenomena and slowness of movement    Subjective:     Frida Coronado is a 78 y.o. right-handed  male seen at the request of Dr. Nuha Bates for evaluation of neurologic complaints including wearing off phenomena and increased tremor and feeling that he needs more medication, despite taking his Sinemet 4 times a day at 6 AM noon 6 PM and midnight, which seems to help control his symptoms, and the fact that he did not seem to get that much better taking Comtan 300 mg 4 times a day. Pulse is very costly to him and because almost $500 a month even though it is generic. I told him to stop that medication we will try him on a dopamine agonist Mirapex 0.25 mg 3 times a day to see if that will be more effective and provide him with more on time and less off time. The problem is that he has a fair amount of off time and it is gradually increasing and gets embarrassing especially socially when he is out and suddenly freezes up particular when he is under more stress and tension. In addition he has a new problem of increasing pain radiating to both arms, which she had a cervical spine x-ray done that showed moderate degenerative disease of the cervical spine at multiple levels, he was given physical therapy last time and did better, so we told him to continue his exercise program that they gave him, we will give him a muscle relaxant to see if that helps him. We will give him tizanidine 4 mg take 1/2 tablet to 1 tablet 3 times a day as needed. He was advised of the side effect as far as sedation and drowsiness. He did not like the Eldepryl, and could not afford the Azilect, and says he has to have a medication that is inexpensive. Other options may be Nourianz, but I think he has to fail his other medicines first. He also has essential tremor and currently takes Mysoline 50 mg a day that doesn't seem to be too out of control. He is trying to exercise regularly, and is able to move fairly well except when he has the off phenomena.   Patient's tremor is accentuated when he has to self catheterize himself, which he does 4 times a day, because of neurogenic bladder that occurred after he had abdominal abscess requiring surgery in 2016. The patient noted the tremors more when he tries to do things that responded to Mysoline he became depressed and now is on a half a tablet tolerating that better. He took Lexapro for a while because of depression. He has a family history of tremors in his paternal aunt tremors and and one brother also was chronically abusing alcohol. 1 maternal aunt had Parkinson's disease. He notices that his voice is softer, the tremor sometimes will occur when he is at rest worse in the right arm compared to the left arm, and he feels that he is moving slower and complains of generalized weakness. He tries exercise regularly, and does not have the endurance he used to. When he goes to bed he has to catheterize himself about 4 hours later he can never go back to bed. He speaks with a soft voice and may have smaller handwriting. Any thing that gets him upset makes his tremor worse. He has had recent H73 level and folic acid levels that were normal, but he does have mild anemia with macrocytosis. He had normal vitamin D levels recently, normal thyroid, normal metabolic screen except for some cholesterol problems. He takes some vitamins on a regular basis. He has no unusual neck or thoracic pain or lumbar pain. His cranial nerves II through XII are otherwise intact has no difficulty speaking or swallowing other than the soft voice. He has had no falls or injuries and no other toxin exposure or neuroleptic exposure. Patient also had a normal carotid Doppler study just recently. He denies any history of stroke or TIA.     Past Medical History:   Diagnosis Date    Arthritis     gouty arthritis in feet & ankles    Gout     HTN (hypertension)       Past Surgical History:   Procedure Laterality Date    COLONOSCOPY N/A 12/28/2016    COL[facility] performed [facility]    HX COLONOSCOPY  2010    HX GI  12/29/2016    LAPAROSCOPIC SIGMOID COLECTOMY/ CYSTOSCOPY/ BILATERAL URETERAL STENT PLACEMENT AND REMOVAL    HX ORTHOPAEDIC      Lumbar surgery age 24    HX UROLOGICAL      bladder     Family History   Problem Relation Age of Onset    Other Mother         old age   [facility] Heart Disease Father     Diabetes Father     Heart Disease Brother     Alcohol abuse Brother     Other Brother         pancreatitis    Anxiety Child     Depression Child     Attention Deficit Disorder Child       Social History     Tobacco Use    Smoking status: Never Smoker    Smokeless tobacco: Never Used   Substance Use Topics    Alcohol use: No         Current Outpatient Medications:     colchicine (MITIGARE) 0.6 mg capsule, , Disp: , Rfl:     trimethoprim-sulfamethoxazole (BACTRIM, SEPTRA)  mg per tablet, Take 1 Tablet by mouth two (2) times a day. Take as doctor prescribes for UTI, Disp: , Rfl:     pramipexole (MIRAPEX) 0.25 mg tablet, Take 1 Tablet by mouth three (3) times daily. , Disp: 270 Tablet, Rfl: 5    tiZANidine (ZANAFLEX) 4 mg tablet, Take 1 Tablet by mouth three (3) times daily as needed for Muscle Spasm(s). , Disp: 100 Tablet, Rfl: 11    entacapone (Comtan) 200 mg tablet, Take 1 Tab by mouth four (4) times daily. , Disp: 360 Tab, Rfl: 5    carbidopa-levodopa (Sinemet)  mg per tablet, Take 1 Tab by mouth four (4) times daily. Indications: a type of movement disorder called parkinsonism, restless legs syndrome, an extreme discomfort in the calf muscles when sitting or lying down, Disp: 360 Tab, Rfl: 4    allopurinoL (ZYLOPRIM) 100 mg tablet, Take  by mouth daily. , Disp: , Rfl:     cholecalciferol (VITAMIN D3) 25 mcg (1,000 unit) cap, Take  by mouth daily. , Disp: , Rfl:         No Known Allergies   MRI Results (most recent):  Results from Orders Only encounter on 08/17/20    MRI BRAIN W WO CONT    Narrative  EXAM:  MRI BRAIN W WO CONT    INDICATION: tremors    COMPARISON:  None. CONTRAST: 20 ml Dotarem. TECHNIQUE:  Multiplanar multisequence acquisition without and with contrast of the brain. FINDINGS:  Stat diffusion imaging does not show acute ischemic changes. Moderate atrophy, mild nonspecific white matter changes. No extra-axial fluid collection, hemorrhage or shift. Flow voids in major vessels at the base of the brain are present. No enhancing lesion no masses. Impression  IMPRESSION: Atrophy and white matter disease. No acute findings no mass. Results from Orders Only encounter on 08/17/20    MRI BRAIN W WO CONT    Narrative  EXAM:  MRI BRAIN W WO CONT    INDICATION:    tremors    COMPARISON:  None. CONTRAST: 20 ml Dotarem. TECHNIQUE:  Multiplanar multisequence acquisition without and with contrast of the brain. FINDINGS:  Stat diffusion imaging does not show acute ischemic changes. Moderate atrophy, mild nonspecific white matter changes. No extra-axial fluid collection, hemorrhage or shift. Flow voids in major vessels at the base of the brain are present. No enhancing lesion no masses. Impression  IMPRESSION: Atrophy and white matter disease. No acute findings no mass. Review of Systems:  A comprehensive review of systems was negative except for: Constitutional: positive for fatigue, malaise and weight loss  Genitourinary: positive for urinary incontinence  Musculoskeletal: positive for myalgias, arthralgias, stiff joints and muscle weakness  Neurological: positive for gait problems, tremor and weakness  Behvioral/Psych: positive for anxiety and depression   Vitals:    10/26/21 1132   BP: 126/84   Pulse: 97   Resp: 18   Temp: 98 °F (36.7 °C)   TempSrc: Temporal   SpO2: 98%   Weight: 213 lb (96.6 kg)   Height: 6' 3\" (1.905 m)     Objective:     I      NEUROLOGICAL EXAM:    Appearance: The patient is well developed, well nourished, provides a coherent history and is in no acute distress.    Mental Status: Oriented to time, place and person, and the president, cognitive function is normal and speech is fluent and no aphasia or dysarthria. Mood and affect appropriate. Cranial Nerves:   Intact visual fields. Fundi are benign, disc are flat, no lesions seen on funduscopy. TARAS, EOM's full, no nystagmus, no ptosis. Facial sensation is normal. Corneal reflexes are not tested. Facial movement is symmetric. Hearing is normal bilaterally. Palate is midline with normal sternocleidomastoid and trapezius muscles are normal. Tongue is midline. Neck without meningismus or bruits  Temporal arteries are not tender or enlarged  TMJ areas are not tender on palpation   Motor:  5/5 strength in upper and lower proximal and distal muscles. Normal bulk and tone throughout except slight increased muscle tone and rigidity and bradykinesia in both upper extremities with reinforcement. No fasciculations. Rapid alternating movement is symmetric and intact bilaterally   Reflexes:   Deep tendon reflexes 2+/4 and symmetrical.  No babinski or clonus present   Sensory:   Normal to touch, pinprick and vibration and temperature intact except slight decrease in both feet. DSS is intact   Gait:  Normal gait for patient's age, except that the patient may move a little slowly and has decreased arm swing on the right. .   Tremor:    Mild bilateral intention tremor noted in both upper extremities, and a mild to moderate somewhat fluctuating rest tremor and static tremor in the right upper extremity and mild involvement in the left upper extremity. Patient has mild micrographia and a slightly soft voice. .   Cerebellar:  No abnormal cerebellar signs present on Romberg and tandem testing and finger-nose-finger exam shows the bilateral intention tremor. Neurovascular:  Normal heart sounds and regular rhythm, peripheral pulses decreased, and no carotid bruits. Assessment:       ICD-10-CM ICD-9-CM    1.  Cervical radiculopathy due to degenerative joint disease of spine  M47.22 721.0 pramipexole (MIRAPEX) 0.25 mg tablet   2. Parkinson's disease (tremor, stiffness, slow motion, unstable posture) ([facility])  G20 332.0 pramipexole (MIRAPEX) 0.25 mg tablet   3. Benign essential tremor syndrome  G25.0 333.1 pramipexole (MIRAPEX) 0.25 mg tablet     Active Problems:    * No active hospital problems. *      Plan:     New problem of patient having increased off spells, despite taking his medication of Sinemet 25/100 mg and Comtan 300 mg faithfully every 6 hours at 6 AM, noon, 6 PM and midnight, and patient intolerant of Eldepryl, and could not afford Azilect, and patient feels he needs new medication or needs to increase his Sinemet  We will try Mirapex 3 times a day with his doses of Sinemet,   He is advised of the side effects as far as increased dyskinesia or GI side effects or any side effect to call us immediately. He has essential tremor that seems stable on Mysoline 50 mg a day. For his sensation of increasing neck pain and radicular pain into both arms, and muscle tension, he is to get back to doing his neck exercises we will give him some Zanaflex one half of a 4 mg tablet up to 1 4 mg tablet taking it 3 times a day as needed. For his weakness we checked a CPK CAMILLE and sed rate to rule out neuromuscular disease, and an anti-neuronal antibody titer and they were all unremarkable. He also has essential tremor that is mild in both upper extremities he is to continue with  Mysoline since the tremor of essential type really seems mild at this time. For his macrocytosis he will get an antiparietal cell antibody titer just to make sure he does not have pernicious anemia and that was okay  For his chronic insomnia recommend he try melatonin 5 to 10 mg to see if that helps first before resorting to medications.   He is to eat a good diet, watch his weight and continue regular exercise program.  We advised to do a combination of cardiovascular strength strengthening exercise, and stretching and balance and flexibility  Patient may need an EMG to evaluate his weakness, we will await the above-mentioned test first.  Recommended a One-A-Day vitamin and vitamin D, and the patient is to continue a regular exercise program, which we advised him can slow the progression of the disease down. Patient will check my chart for results of his test, 1 hour for the patient going over his history, reviewing his labs, looking at his diagnosis and prognosis, and diagnostic testing needed and patient agrees with plans as outlined above. 35 minutes spent with the patient today, discussing his therapeutic options, and other options in the future we discussed with him with dopamine agonist or extended release Comtan or Stalevo or adenosine blockers  He will call if any problem in the interim otherwise we will see him in 3 months time. Signed By: Cedric Briscoe MD     October 26, 2021       CC: Penelope Reese MD  FAX: [phone]          If you have questions, please do not hesitate to call me. I look forward to following your patient along with you.       Sincerely,    Cedric Briscoe MD

## 2021-11-30 DIAGNOSIS — G20 PARKINSON'S DISEASE (TREMOR, STIFFNESS, SLOW MOTION, UNSTABLE POSTURE) (HCC): ICD-10-CM

## 2021-11-30 DIAGNOSIS — G25.0 BENIGN ESSENTIAL TREMOR SYNDROME: ICD-10-CM

## 2021-11-30 DIAGNOSIS — M47.22 CERVICAL RADICULOPATHY DUE TO DEGENERATIVE JOINT DISEASE OF SPINE: ICD-10-CM

## 2021-11-30 RX ORDER — PRAMIPEXOLE DIHYDROCHLORIDE 0.25 MG/1
0.5 TABLET ORAL
Qty: 360 TABLET | Refills: 5 | Status: SHIPPED | OUTPATIENT
Start: 2021-11-30

## 2022-02-28 ENCOUNTER — OFFICE VISIT (OUTPATIENT)
Dept: NEUROLOGY | Age: 67
End: 2022-02-28
Payer: MEDICARE

## 2022-02-28 VITALS
OXYGEN SATURATION: 97 % | DIASTOLIC BLOOD PRESSURE: 78 MMHG | SYSTOLIC BLOOD PRESSURE: 132 MMHG | HEART RATE: 66 BPM | RESPIRATION RATE: 18 BRPM | WEIGHT: 210 LBS | TEMPERATURE: 97.4 F | BODY MASS INDEX: 26.25 KG/M2

## 2022-02-28 DIAGNOSIS — M47.22 CERVICAL RADICULOPATHY DUE TO DEGENERATIVE JOINT DISEASE OF SPINE: ICD-10-CM

## 2022-02-28 DIAGNOSIS — R25.1 TREMORS OF NERVOUS SYSTEM: ICD-10-CM

## 2022-02-28 DIAGNOSIS — R53.1 WEAKNESS GENERALIZED: ICD-10-CM

## 2022-02-28 DIAGNOSIS — G20 PARKINSON'S DISEASE (TREMOR, STIFFNESS, SLOW MOTION, UNSTABLE POSTURE) (HCC): Primary | ICD-10-CM

## 2022-02-28 DIAGNOSIS — M25.512 CHRONIC PAIN OF BOTH SHOULDERS: ICD-10-CM

## 2022-02-28 DIAGNOSIS — M25.511 CHRONIC PAIN OF BOTH SHOULDERS: ICD-10-CM

## 2022-02-28 DIAGNOSIS — G89.29 CHRONIC PAIN OF BOTH SHOULDERS: ICD-10-CM

## 2022-02-28 DIAGNOSIS — G25.0 BENIGN ESSENTIAL TREMOR SYNDROME: ICD-10-CM

## 2022-02-28 PROCEDURE — 99214 OFFICE O/P EST MOD 30 MIN: CPT | Performed by: PSYCHIATRY & NEUROLOGY

## 2022-02-28 PROCEDURE — 3017F COLORECTAL CA SCREEN DOC REV: CPT | Performed by: PSYCHIATRY & NEUROLOGY

## 2022-02-28 PROCEDURE — G8536 NO DOC ELDER MAL SCRN: HCPCS | Performed by: PSYCHIATRY & NEUROLOGY

## 2022-02-28 PROCEDURE — 1101F PT FALLS ASSESS-DOCD LE1/YR: CPT | Performed by: PSYCHIATRY & NEUROLOGY

## 2022-02-28 PROCEDURE — G8510 SCR DEP NEG, NO PLAN REQD: HCPCS | Performed by: PSYCHIATRY & NEUROLOGY

## 2022-02-28 PROCEDURE — G8427 DOCREV CUR MEDS BY ELIG CLIN: HCPCS | Performed by: PSYCHIATRY & NEUROLOGY

## 2022-02-28 PROCEDURE — G8419 CALC BMI OUT NRM PARAM NOF/U: HCPCS | Performed by: PSYCHIATRY & NEUROLOGY

## 2022-02-28 RX ORDER — CARBIDOPA AND LEVODOPA 25; 100 MG/1; MG/1
1 TABLET ORAL 4 TIMES DAILY
Qty: 360 TABLET | Refills: 4 | Status: SHIPPED | OUTPATIENT
Start: 2022-02-28

## 2022-02-28 RX ORDER — ENTACAPONE 200 MG/1
200 TABLET ORAL 4 TIMES DAILY
Qty: 360 TABLET | Refills: 5 | Status: SHIPPED | OUTPATIENT
Start: 2022-02-28

## 2022-02-28 NOTE — LETTER
2/28/2022    Patient: Fabian Campuzano   YOB: 1955   Date of Visit: 2/28/2022     Ruth Walsh MD  [address] [place] [address]  [address] [address]  Via Fax: [phone]    Dear Ruth Walsh MD,      Thank you for referring Mr. Janet Cleveland to [facility] for evaluation. My notes for this consultation are attached. Consult  REFERRED BY:  Jose Luis Valera MD    CHIEF COMPLAINT: Increased wearing off phenomena and slowness of movement    Subjective:     Fabian Campuzano is a 78 y.o. right-handed  male seen at the request of Dr. Kendall Lynch for evaluation of new problem of thinking that he has Parkinson's symptoms as manifest by increasing shoulder pain, right side greater than left, and on exam, he clearly has an impingement syndrome, probably indicating a rotator cuff problem, and we advised him that this was not a side effect of his Parkinson's disease but more degenerative arthritis of her shoulders and he is interested in seeing an orthopedic surgeon so we referred him to Dr. Benita Edward for that. He is also seen for evaluation of his Parkinson's disease, his main problem that he does get drowsy on the medication occasionally, but has worked out a system where he can function fairly well he takes Sinemet 25/100 mg pills with . 25 mg of Mirapex and Comtan 300 mg 4 times a day and his off phenomena has gotten a lot less frequent. We mentioned Azilect again, and he would consider that but he does not seem to need it now as he is doing fairly well so we will hold that as an option in the future but previously he could not afford it but now has different insurance. He is trying to stay fit and plays golf about 4 times a week, and feels that physically he is doing well we did encourage that and urged him to continue exercising because that seems to slow the process of Parkinson's disease down.   In addition he has a new problem of increasing pain radiating to both arms, which he had a cervical spine x-ray done that showed moderate degenerative disease of the cervical spine at multiple levels, he was given physical therapy last time and did better, so we told him to continue his exercise program that they gave him, we will give him a muscle relaxant to see if that helps him. We will give him tizanidine 4 mg take 1/2 tablet to 1 tablet 3 times a day as needed. He was advised of the side effect as far as sedation and drowsiness. He did not like the Eldepryl, and could not afford the Azilect, and says he has to have a medication that is inexpensive. Other options may be Nourianz, but I think he has to fail his other medicines first. He also has essential tremor and currently takes Mysoline 50 mg a day that doesn't seem to be too out of control. He is trying to exercise regularly, and is able to move fairly well except when he has the off phenomena. Patient's tremor is accentuated when he has to self catheterize himself, which he does 4 times a day, because of neurogenic bladder that occurred after he had abdominal abscess requiring surgery in 2016. The patient noted the tremors more when he tries to do things that responded to Mysoline he became depressed and now is on a half a tablet tolerating that better. He took Lexapro for a while because of depression. He has a family history of tremors in his paternal aunt tremors and and one brother also was chronically abusing alcohol. 1 maternal aunt had Parkinson's disease. He notices that his voice is softer, the tremor sometimes will occur when he is at rest worse in the right arm compared to the left arm, and he feels that he is moving slower and complains of generalized weakness. He tries exercise regularly, and does not have the endurance he used to. When he goes to bed he has to catheterize himself about 4 hours later he can never go back to bed.   He speaks with a soft voice and may have smaller handwriting. Any thing that gets him upset makes his tremor worse. He has had recent D75 level and folic acid levels that were normal, but he does have mild anemia with macrocytosis. He had normal vitamin D levels recently, normal thyroid, normal metabolic screen except for some cholesterol problems. He takes some vitamins on a regular basis. He has no unusual neck or thoracic pain or lumbar pain. His cranial nerves II through XII are otherwise intact has no difficulty speaking or swallowing other than the soft voice. He has had no falls or injuries and no other toxin exposure or neuroleptic exposure. Patient also had a normal carotid Doppler study just recently. He denies any history of stroke or TIA. Past Medical History:   Diagnosis Date    Arthritis     gouty arthritis in feet & ankles    Gout     HTN (hypertension)       Past Surgical History:   Procedure Laterality Date    COLONOSCOPY N/A 12/28/2016    COLONOSCOPY [facility]    HX COLONOSCOPY  2010    HX GI  12/29/2016    LAPAROSCOPIC SIGMOID COLECTOMY/ CYSTOSCOPY/ BILATERAL URETERAL STENT PLACEMENT AND REMOVAL    HX ORTHOPAEDIC      Lumbar surgery age 24    HX UROLOGICAL      bladder     Family History   Problem Relation Age of Onset    Other Mother         old age   Spears Heart Disease Father     Diabetes Father     Heart Disease Brother     Alcohol abuse Brother     Other Brother         pancreatitis    Anxiety Child     Depression Child     Attention Deficit Disorder Child       Social History     Tobacco Use    Smoking status: Never Smoker    Smokeless tobacco: Never Used   Substance Use Topics    Alcohol use: No         Current Outpatient Medications:     carbidopa-levodopa (Sinemet)  mg per tablet, Take 1 Tablet by mouth four (4) times daily.  Indications: a type of movement disorder called parkinsonism, restless legs syndrome, an extreme discomfort in the calf muscles when sitting or lying down, Disp: 360 Tablet, Rfl: 4    entacapone (Comtan) 200 mg tablet, Take 1 Tablet by mouth four (4) times daily. , Disp: 360 Tablet, Rfl: 5    pramipexole (MIRAPEX) 0.25 mg tablet, Take 2 Tablets by mouth two (2) times daily (after meals). , Disp: 360 Tablet, Rfl: 5    colchicine (MITIGARE) 0.6 mg capsule, , Disp: , Rfl:     tiZANidine (ZANAFLEX) 4 mg tablet, Take 1 Tablet by mouth three (3) times daily as needed for Muscle Spasm(s). , Disp: 100 Tablet, Rfl: 11    allopurinoL (ZYLOPRIM) 100 mg tablet, Take  by mouth daily. , Disp: , Rfl:     cholecalciferol (VITAMIN D3) 25 mcg (1,000 unit) cap, Take  by mouth daily. , Disp: , Rfl:         No Known Allergies   MRI Results (most recent):  Results from Orders Only encounter on 08/17/20    MRI BRAIN W WO CONT    Narrative  EXAM:  MRI BRAIN W WO CONT    INDICATION:    tremors    COMPARISON:  None. CONTRAST: 20 ml Dotarem. TECHNIQUE:  Multiplanar multisequence acquisition without and with contrast of the brain. FINDINGS:  Stat diffusion imaging does not show acute ischemic changes. Moderate atrophy, mild nonspecific white matter changes. No extra-axial fluid collection, hemorrhage or shift. Flow voids in major vessels at the base of the brain are present. No enhancing lesion no masses. Impression  IMPRESSION: Atrophy and white matter disease. No acute findings no mass. Results from Orders Only encounter on 08/17/20    MRI BRAIN W WO CONT    Narrative  EXAM:  MRI BRAIN W WO CONT    INDICATION:    tremors    COMPARISON:  None. CONTRAST: 20 ml Dotarem. TECHNIQUE:  Multiplanar multisequence acquisition without and with contrast of the brain. FINDINGS:  Stat diffusion imaging does not show acute ischemic changes. Moderate atrophy, mild nonspecific white matter changes. No extra-axial fluid collection, hemorrhage or shift. Flow voids in major vessels at the base of the brain are present.   No enhancing lesion no masses. Impression  IMPRESSION: Atrophy and white matter disease. No acute findings no mass. Review of Systems:  A comprehensive review of systems was negative except for: Constitutional: positive for fatigue, malaise and weight loss  Genitourinary: positive for urinary incontinence  Musculoskeletal: positive for myalgias, arthralgias, stiff joints and muscle weakness  Neurological: positive for gait problems, tremor and weakness  Behvioral/Psych: positive for anxiety and depression   Vitals:    02/28/22 1419   BP: 132/78   Pulse: 66   Resp: 18   Temp: 97.4 °F (36.3 °C)   TempSrc: Temporal   SpO2: 97%   Weight: 210 lb (95.3 kg)     Objective:     I      NEUROLOGICAL EXAM:    Appearance: The patient is well developed, well nourished, provides a coherent history and is in no acute distress. Mental Status: Oriented to time, place and person, and the president, cognitive function is normal and speech is fluent and no aphasia or dysarthria. Mood and affect appropriate. Cranial Nerves:   Intact visual fields. Fundi are benign, disc are flat, no lesions seen on funduscopy. TARAS, EOM's full, no nystagmus, no ptosis. Facial sensation is normal. Corneal reflexes are not tested. Facial movement is symmetric. Hearing is normal bilaterally. Palate is midline with normal sternocleidomastoid and trapezius muscles are normal. Tongue is midline. Neck without meningismus or bruits  Temporal arteries are not tender or enlarged  TMJ areas are not tender on palpation   Motor:  5/5 strength in upper and lower proximal and distal muscles. Normal bulk and tone throughout except slight increased muscle tone and rigidity and bradykinesia in both upper extremities with reinforcement. No fasciculations. Rapid alternating movement is symmetric and intact bilaterally  Patient does have mild limitation on range of motion of his shoulders, right greater than left indicating probable rotator cuff problem.    Reflexes:   Deep tendon reflexes 2+/4 and symmetrical.  No babinski or clonus present   Sensory:   Normal to touch, pinprick and vibration and temperature intact except slight decrease in both feet. DSS is intact   Gait:  Normal gait for patient's age, except that the patient may move a little slowly and has decreased arm swing on the right. .   Tremor:    Mild bilateral intention tremor noted in both upper extremities, and a mild to moderate somewhat fluctuating rest tremor and static tremor in the right upper extremity and mild involvement in the left upper extremity. Patient has mild micrographia and a slightly soft voice. .   Cerebellar:  No abnormal cerebellar signs present on Romberg and tandem testing and finger-nose-finger exam shows the bilateral intention tremor. Neurovascular:  Normal heart sounds and regular rhythm, peripheral pulses decreased, and no carotid bruits. Assessment:       ICD-10-CM ICD-9-CM    1. Parkinson's disease (tremor, stiffness, slow motion, unstable posture) ([facility])  G20 332.0 carbidopa-levodopa (Sinemet)  mg per tablet      entacapone (Comtan) 200 mg tablet      REFERRAL TO ORTHOPEDICS   2. Cervical radiculopathy due to degenerative joint disease of spine  M47.22 721.0 carbidopa-levodopa (Sinemet)  mg per tablet      entacapone (Comtan) 200 mg tablet      REFERRAL TO ORTHOPEDICS   3. Benign essential tremor syndrome  G25.0 333.1 carbidopa-levodopa (Sinemet)  mg per tablet      entacapone (Comtan) 200 mg tablet      REFERRAL TO ORTHOPEDICS   4. Weakness generalized  R53.1 780.79 carbidopa-levodopa (Sinemet)  mg per tablet      entacapone (Comtan) 200 mg tablet      REFERRAL TO ORTHOPEDICS   5. Tremors of nervous system  R25.1 781.0 carbidopa-levodopa (Sinemet)  mg per tablet      entacapone (Comtan) 200 mg tablet      REFERRAL TO ORTHOPEDICS   6.  Chronic pain of both shoulders  M25.511 719.41 carbidopa-levodopa (Sinemet)  mg per tablet    G89.29 338.29 entacapone (Comtan) 200 mg tablet    M25.512  REFERRAL TO ORTHOPEDICS     Active Problems:    * No active hospital problems. *      Plan:   Patient with new problem of increasing shoulder pain we did reassure him that this is most likely secondary to his arthritis and not his  Parkinson's disease. Patient previously seen for problem of patient having increased off spells, despite taking his medication of Sinemet 25/100 mg and Comtan 300 mg faithfully every 6 hours at 6 AM, noon, 6 PM and midnight, and patient intolerant of Eldepryl, and could not afford Azilect, and patient feels he is much better on Mirapex . 25 mg in addition 4 times a day. He will continue that and another trial of Azilect in the future may be needed if he gets worse but right now he is doing okay  He is advised of the side effects as far as increased dyskinesia or GI side effects or any side effect to call us immediately. He has essential tremor that seems stable on Mysoline 50 mg a day. For his sensation of increasing neck pain and radicular pain into both arms, and muscle tension, he is to get back to doing his neck exercises we will give him some Zanaflex one half of a 4 mg tablet up to 1 4 mg tablet taking it 3 times a day as needed. For his weakness we checked a CPK CAMILLE and sed rate to rule out neuromuscular disease, and an anti-neuronal antibody titer and they were all unremarkable. He also has essential tremor that is mild in both upper extremities he is to continue with  Mysoline since the tremor of essential type really seems mild at this time. For his macrocytosis he will get an antiparietal cell antibody titer just to make sure he does not have pernicious anemia and that was okay  For his chronic insomnia recommend he try melatonin 5 to 10 mg to see if that helps first before resorting to medications.   He is to eat a good diet, watch his weight and continue regular exercise program.  We advised to do a combination of cardiovascular strength strengthening exercise, and stretching and balance and flexibility  Patient may need an EMG to evaluate his weakness, we will await the above-mentioned test first.  Recommended a One-A-Day vitamin and vitamin D, and the patient is to continue a regular exercise program, which we advised him can slow the progression of the disease down. Patient will check my chart for results of his test, 1 hour for the patient going over his history, reviewing his labs, looking at his diagnosis and prognosis, and diagnostic testing needed and patient agrees with plans as outlined above. 32 minutes spent with the patient today, discussing his therapeutic options, and other options in the future we discussed with him with dopamine agonist or extended release Comtan or Stalevo or adenosine blockers  He will call if any problem in the interim otherwise we will see him in 3 months time. Signed By: Marquise Coates MD     February 28, 2022       CC: Diane Campbell MD  FAX: [phone]        If you have questions, please do not hesitate to call me. I look forward to following your patient along with you.       Sincerely,    Marquise Coates MD

## 2022-03-01 NOTE — PROGRESS NOTES
Consult  REFERRED BY:  Bradley Rodríguez MD    CHIEF COMPLAINT: Increased wearing off phenomena and slowness of movement    Subjective:     Alejandro Vinson is a 78 y.o. right-handed  male seen at the request of Dr. Mary De La Vega for evaluation of new problem of thinking that he has Parkinson's symptoms as manifest by increasing shoulder pain, right side greater than left, and on exam, he clearly has an impingement syndrome, probably indicating a rotator cuff problem, and we advised him that this was not a side effect of his Parkinson's disease but more degenerative arthritis of her shoulders and he is interested in seeing an orthopedic surgeon so we referred him to Dr. Pauline Contreras for that. He is also seen for evaluation of his Parkinson's disease, his main problem that he does get drowsy on the medication occasionally, but has worked out a system where he can function fairly well he takes Sinemet 25/100 mg pills with . 25 mg of Mirapex and Comtan 300 mg 4 times a day and his off phenomena has gotten a lot less frequent. We mentioned Azilect again, and he would consider that but he does not seem to need it now as he is doing fairly well so we will hold that as an option in the future but previously he could not afford it but now has different insurance. He is trying to stay fit and plays golf about 4 times a week, and feels that physically he is doing well we did encourage that and urged him to continue exercising because that seems to slow the process of Parkinson's disease down. In addition he has a new problem of increasing pain radiating to both arms, which he had a cervical spine x-ray done that showed moderate degenerative disease of the cervical spine at multiple levels, he was given physical therapy last time and did better, so we told him to continue his exercise program that they gave him, we will give him a muscle relaxant to see if that helps him.   We will give him tizanidine 4 mg take 1/2 tablet to 1 tablet 3 times a day as needed. He was advised of the side effect as far as sedation and drowsiness. He did not like the Eldepryl, and could not afford the Azilect, and says he has to have a medication that is inexpensive. Other options may be Nourianz, but I think he has to fail his other medicines first. He also has essential tremor and currently takes Mysoline 50 mg a day that doesn't seem to be too out of control. He is trying to exercise regularly, and is able to move fairly well except when he has the off phenomena. Patient's tremor is accentuated when he has to self catheterize himself, which he does 4 times a day, because of neurogenic bladder that occurred after he had abdominal abscess requiring surgery in 2016. The patient noted the tremors more when he tries to do things that responded to Mysoline he became depressed and now is on a half a tablet tolerating that better. He took Lexapro for a while because of depression. He has a family history of tremors in his paternal aunt tremors and and one brother also was chronically abusing alcohol. 1 maternal aunt had Parkinson's disease. He notices that his voice is softer, the tremor sometimes will occur when he is at rest worse in the right arm compared to the left arm, and he feels that he is moving slower and complains of generalized weakness. He tries exercise regularly, and does not have the endurance he used to. When he goes to bed he has to catheterize himself about 4 hours later he can never go back to bed. He speaks with a soft voice and may have smaller handwriting. Any thing that gets him upset makes his tremor worse. He has had recent V41 level and folic acid levels that were normal, but he does have mild anemia with macrocytosis. He had normal vitamin D levels recently, normal thyroid, normal metabolic screen except for some cholesterol problems. He takes some vitamins on a regular basis.   He has no unusual neck or thoracic pain or lumbar pain. His cranial nerves II through XII are otherwise intact has no difficulty speaking or swallowing other than the soft voice. He has had no falls or injuries and no other toxin exposure or neuroleptic exposure. Patient also had a normal carotid Doppler study just recently. He denies any history of stroke or TIA. Past Medical History:   Diagnosis Date    Arthritis     gouty arthritis in feet & ankles    Gout     HTN (hypertension)       Past Surgical History:   Procedure Laterality Date    COLONOSCOPY N/A 12/28/2016    COL[facility]    HX COLONOSCOPY  2010    HX GI  12/29/2016    LAPAROSCOPIC SIGMOID COLECTOMY/ CYSTOSCOPY/ BILATERAL URETERAL STENT PLACEMENT AND REMOVAL    HX ORTHOPAEDIC      Lumbar surgery age 24    HX UROLOGICAL      bladder     Family History   Problem Relation Age of Onset    Other Mother         old age   [place] Pet[place] Heart Disease Father     Diabetes Father     Heart Disease Brother     Alcohol abuse Brother     Other Brother         pancreatitis    Anxiety Child     Depression Child     Attention Deficit Disorder Child       Social History     Tobacco Use    Smoking status: Never Smoker    Smokeless tobacco: Never Used   Substance Use Topics    Alcohol use: No         Current Outpatient Medications:     carbidopa-levodopa (Sinemet)  mg per tablet, Take 1 Tablet by mouth four (4) times daily. Indications: a type of movement disorder called parkinsonism, restless legs syndrome, an extreme discomfort in the calf muscles when sitting or lying down, Disp: 360 Tablet, Rfl: 4    entacapone (Comtan) 200 mg tablet, Take 1 Tablet by mouth four (4) times daily. , Disp: 360 Tablet, Rfl: 5    pramipexole (MIRAPEX) 0.25 mg tablet, Take 2 Tablets by mouth two (2) times daily (after meals). , Disp: 360 Tablet, Rfl: 5    colchicine (MITIGARE) 0.6 mg capsule, , Disp: , Rfl:     tiZANidine (ZANAFLEX) 4 mg tablet, Take 1 Tablet by mouth three (3) times daily as needed for Muscle Spasm(s). , Disp: 100 Tablet, Rfl: 11    allopurinoL (ZYLOPRIM) 100 mg tablet, Take  by mouth daily. , Disp: , Rfl:     cholecalciferol (VITAMIN D3) 25 mcg (1,000 unit) cap, Take  by mouth daily. , Disp: , Rfl:         No Known Allergies   MRI Results (most recent):  Results from Orders Only encounter on 08/17/20    MRI BRAIN W WO CONT    Narrative  EXAM:  MRI BRAIN W WO CONT    INDICATION:    tremors    COMPARISON:  None. CONTRAST: 20 ml Dotarem. TECHNIQUE:  Multiplanar multisequence acquisition without and with contrast of the brain. FINDINGS:  Stat diffusion imaging does not show acute ischemic changes. Moderate atrophy, mild nonspecific white matter changes. No extra-axial fluid collection, hemorrhage or shift. Flow voids in major vessels at the base of the brain are present. No enhancing lesion no masses. Impression  IMPRESSION: Atrophy and white matter disease. No acute findings no mass. Results from Orders Only encounter on 08/17/20    MRI BRAIN W WO CONT    Narrative  EXAM:  MRI BRAIN W WO CONT    INDICATION:    tremors    COMPARISON:  None. CONTRAST: 20 ml Dotarem. TECHNIQUE:  Multiplanar multisequence acquisition without and with contrast of the brain. FINDINGS:  Stat diffusion imaging does not show acute ischemic changes. Moderate atrophy, mild nonspecific white matter changes. No extra-axial fluid collection, hemorrhage or shift. Flow voids in major vessels at the base of the brain are present. No enhancing lesion no masses. Impression  IMPRESSION: Atrophy and white matter disease. No acute findings no mass.     Review of Systems:  A comprehensive review of systems was negative except for: Constitutional: positive for fatigue, malaise and weight loss  Genitourinary: positive for urinary incontinence  Musculoskeletal: positive for myalgias, arthralgias, stiff joints and muscle weakness  Neurological: positive for gait problems, tremor and weakness  Behvioral/Psych: positive for anxiety and depression   Vitals:    02/28/22 1419   BP: 132/78   Pulse: 66   Resp: 18   Temp: 97.4 °F (36.3 °C)   TempSrc: Temporal   SpO2: 97%   Weight: 210 lb (95.3 kg)     Objective:     I      NEUROLOGICAL EXAM:    Appearance: The patient is well developed, well nourished, provides a coherent history and is in no acute distress. Mental Status: Oriented to time, place and person, and the president, cognitive function is normal and speech is fluent and no aphasia or dysarthria. Mood and affect appropriate. Cranial Nerves:   Intact visual fields. Fundi are benign, disc are flat, no lesions seen on funduscopy. TARAS, EOM's full, no nystagmus, no ptosis. Facial sensation is normal. Corneal reflexes are not tested. Facial movement is symmetric. Hearing is normal bilaterally. Palate is midline with normal sternocleidomastoid and trapezius muscles are normal. Tongue is midline. Neck without meningismus or bruits  Temporal arteries are not tender or enlarged  TMJ areas are not tender on palpation   Motor:  5/5 strength in upper and lower proximal and distal muscles. Normal bulk and tone throughout except slight increased muscle tone and rigidity and bradykinesia in both upper extremities with reinforcement. No fasciculations. Rapid alternating movement is symmetric and intact bilaterally  Patient does have mild limitation on range of motion of his shoulders, right greater than left indicating probable rotator cuff problem. Reflexes:   Deep tendon reflexes 2+/4 and symmetrical.  No babinski or clonus present   Sensory:   Normal to touch, pinprick and vibration and temperature intact except slight decrease in both feet. DSS is intact   Gait:  Normal gait for patient's age, except that the patient may move a little slowly and has decreased arm swing on the right. .   Tremor:    Mild bilateral intention tremor noted in both upper extremities, and a mild to moderate somewhat fluctuating rest tremor and static tremor in the right upper extremity and mild involvement in the left upper extremity. Patient has mild micrographia and a slightly soft voice. .   Cerebellar:  No abnormal cerebellar signs present on Romberg and tandem testing and finger-nose-finger exam shows the bilateral intention tremor. Neurovascular:  Normal heart sounds and regular rhythm, peripheral pulses decreased, and no carotid bruits. Assessment:       ICD-10-CM ICD-9-CM    1. Parkinson's disease (tremor, stiffness, slow motion, unstable posture) ([facility])  G20 332.0 carbidopa-levodopa (Sinemet)  mg per tablet      entacapone (Comtan) 200 mg tablet      REFERRAL TO ORTHOPEDICS   2. Cervical radiculopathy due to degenerative joint disease of spine  M47.22 721.0 carbidopa-levodopa (Sinemet)  mg per tablet      entacapone (Comtan) 200 mg tablet      REFERRAL TO ORTHOPEDICS   3. Benign essential tremor syndrome  G25.0 333.1 carbidopa-levodopa (Sinemet)  mg per tablet      entacapone (Comtan) 200 mg tablet      REFERRAL TO ORTHOPEDICS   4. Weakness generalized  R53.1 780.79 carbidopa-levodopa (Sinemet)  mg per tablet      entacapone (Comtan) 200 mg tablet      REFERRAL TO ORTHOPEDICS   5. Tremors of nervous system  R25.1 781.0 carbidopa-levodopa (Sinemet)  mg per tablet      entacapone (Comtan) 200 mg tablet      REFERRAL TO ORTHOPEDICS   6. Chronic pain of both shoulders  M25.511 719.41 carbidopa-levodopa (Sinemet)  mg per tablet    G89.29 338.29 entacapone (Comtan) 200 mg tablet    M25.512  REFERRAL TO ORTHOPEDICS     Active Problems:    * No active hospital problems. *      Plan:   Patient with new problem of increasing shoulder pain we did reassure him that this is most likely secondary to his arthritis and not his  Parkinson's disease.   Patient previously seen for problem of patient having increased off spells, despite taking his medication of Sinemet 25/100 mg and Comtan 300 mg faithfully every 6 hours at 6 AM, noon, 6 PM and midnight, and patient intolerant of Eldepryl, and could not afford Azilect, and patient feels he is much better on Mirapex . 25 mg in addition 4 times a day. He will continue that and another trial of Azilect in the future may be needed if he gets worse but right now he is doing okay  He is advised of the side effects as far as increased dyskinesia or GI side effects or any side effect to call us immediately. He has essential tremor that seems stable on Mysoline 50 mg a day. For his sensation of increasing neck pain and radicular pain into both arms, and muscle tension, he is to get back to doing his neck exercises we will give him some Zanaflex one half of a 4 mg tablet up to 1 4 mg tablet taking it 3 times a day as needed. For his weakness we checked a CPK CAMILLE and sed rate to rule out neuromuscular disease, and an anti-neuronal antibody titer and they were all unremarkable. He also has essential tremor that is mild in both upper extremities he is to continue with  Mysoline since the tremor of essential type really seems mild at this time. For his macrocytosis he will get an antiparietal cell antibody titer just to make sure he does not have pernicious anemia and that was okay  For his chronic insomnia recommend he try melatonin 5 to 10 mg to see if that helps first before resorting to medications. He is to eat a good diet, watch his weight and continue regular exercise program.  We advised to do a combination of cardiovascular strength strengthening exercise, and stretching and balance and flexibility  Patient may need an EMG to evaluate his weakness, we will await the above-mentioned test first.  Recommended a One-A-Day vitamin and vitamin D, and the patient is to continue a regular exercise program, which we advised him can slow the progression of the disease down.   Patient will check my chart for results of his test, 1 hour for the patient going over his history, reviewing his labs, looking at his diagnosis and prognosis, and diagnostic testing needed and patient agrees with plans as outlined above. 32 minutes spent with the patient today, discussing his therapeutic options, and other options in the future we discussed with him with dopamine agonist or extended release Comtan or Stalevo or adenosine blockers  He will call if any problem in the interim otherwise we will see him in 3 months time.           Signed By: Migue Newsome MD     February 28, 2022       CC: Sulma Engel MD  FAX: [phone]

## 2022-03-18 PROBLEM — G20.A1 PARKINSON'S DISEASE (TREMOR, STIFFNESS, SLOW MOTION, UNSTABLE POSTURE): Status: ACTIVE | Noted: 2021-04-08

## 2022-03-18 PROBLEM — M47.22 CERVICAL RADICULOPATHY DUE TO DEGENERATIVE JOINT DISEASE OF SPINE: Status: ACTIVE | Noted: 2021-04-08

## 2022-03-18 PROBLEM — G20 PARKINSON'S DISEASE (TREMOR, STIFFNESS, SLOW MOTION, UNSTABLE POSTURE) (HCC): Status: ACTIVE | Noted: 2021-04-08

## 2022-03-19 PROBLEM — R53.1 WEAKNESS GENERALIZED: Status: ACTIVE | Noted: 2020-08-10

## 2022-03-19 PROBLEM — R25.1 TREMORS OF NERVOUS SYSTEM: Status: ACTIVE | Noted: 2020-08-10

## 2022-03-19 PROBLEM — G25.0 BENIGN ESSENTIAL TREMOR SYNDROME: Status: ACTIVE | Noted: 2020-08-10

## 2022-03-20 PROBLEM — M25.511 CHRONIC PAIN OF BOTH SHOULDERS: Status: ACTIVE | Noted: 2022-02-28

## 2022-03-20 PROBLEM — G89.29 CHRONIC PAIN OF BOTH SHOULDERS: Status: ACTIVE | Noted: 2022-02-28

## 2022-03-20 PROBLEM — M25.512 CHRONIC PAIN OF BOTH SHOULDERS: Status: ACTIVE | Noted: 2022-02-28

## 2022-10-17 ENCOUNTER — OFFICE VISIT (OUTPATIENT)
Dept: NEUROLOGY | Age: 67
End: 2022-10-17
Payer: MEDICARE

## 2022-10-17 VITALS
WEIGHT: 219.4 LBS | DIASTOLIC BLOOD PRESSURE: 80 MMHG | RESPIRATION RATE: 18 BRPM | SYSTOLIC BLOOD PRESSURE: 138 MMHG | HEART RATE: 62 BPM | TEMPERATURE: 97.4 F | OXYGEN SATURATION: 97 % | BODY MASS INDEX: 27.28 KG/M2 | HEIGHT: 75 IN

## 2022-10-17 DIAGNOSIS — R53.1 WEAKNESS GENERALIZED: Primary | ICD-10-CM

## 2022-10-17 DIAGNOSIS — R25.1 TREMORS OF NERVOUS SYSTEM: ICD-10-CM

## 2022-10-17 DIAGNOSIS — M47.22 CERVICAL RADICULOPATHY DUE TO DEGENERATIVE JOINT DISEASE OF SPINE: ICD-10-CM

## 2022-10-17 DIAGNOSIS — G20 PARKINSON'S DISEASE (TREMOR, STIFFNESS, SLOW MOTION, UNSTABLE POSTURE) (HCC): ICD-10-CM

## 2022-10-17 DIAGNOSIS — G25.0 BENIGN ESSENTIAL TREMOR SYNDROME: ICD-10-CM

## 2022-10-17 PROCEDURE — 1123F ACP DISCUSS/DSCN MKR DOCD: CPT | Performed by: PSYCHIATRY & NEUROLOGY

## 2022-10-17 PROCEDURE — G8417 CALC BMI ABV UP PARAM F/U: HCPCS | Performed by: PSYCHIATRY & NEUROLOGY

## 2022-10-17 PROCEDURE — G8510 SCR DEP NEG, NO PLAN REQD: HCPCS | Performed by: PSYCHIATRY & NEUROLOGY

## 2022-10-17 PROCEDURE — 1101F PT FALLS ASSESS-DOCD LE1/YR: CPT | Performed by: PSYCHIATRY & NEUROLOGY

## 2022-10-17 PROCEDURE — 99214 OFFICE O/P EST MOD 30 MIN: CPT | Performed by: PSYCHIATRY & NEUROLOGY

## 2022-10-17 PROCEDURE — G8427 DOCREV CUR MEDS BY ELIG CLIN: HCPCS | Performed by: PSYCHIATRY & NEUROLOGY

## 2022-10-17 PROCEDURE — G8536 NO DOC ELDER MAL SCRN: HCPCS | Performed by: PSYCHIATRY & NEUROLOGY

## 2022-10-17 PROCEDURE — 3017F COLORECTAL CA SCREEN DOC REV: CPT | Performed by: PSYCHIATRY & NEUROLOGY

## 2022-10-17 RX ORDER — LANOLIN ALCOHOL/MO/W.PET/CERES
1000 CREAM (GRAM) TOPICAL DAILY
COMMUNITY

## 2022-10-17 RX ORDER — TRAZODONE HYDROCHLORIDE 50 MG/1
50 TABLET ORAL
Qty: 30 TABLET | Refills: 11 | Status: SHIPPED | OUTPATIENT
Start: 2022-10-17

## 2022-10-17 NOTE — LETTER
10/17/2022    Patient: Momo Thakkar   YOB: 1955   Date of Visit: 10/17/2022     Lida Sage MD  [address] [place] [address]  [phone] [id number]  Via Fax: [phone]    Dear Lida Sage MD,      Thank you for referring Mr. Elias Marcelino to [facility] for evaluation. My notes for this consultation are attached. Consult  REFERRED BY:  Martinez Townsend MD    CHIEF COMPLAINT: Increased wearing off phenomena and slowness of movement    Subjective:     Momo Thakkar is a 75 y.o. right-handed  male seen at the request of Dr. Brittani Cano for evaluation of new problem of thinking that he has Parkinson's symptoms as manifest by increasing shoulder pain, bilaterally that hurts more when he just sits still and seems to be an ache in both shoulders but does not really have a restricted movement more recently, and never saw the orthopedic surgeon we sent him to, and thinks it may be more his Parkinson's disease then a rotator cuff problem. He is still not sleeping very well, despite taking melatonin, but his Parkinson's disease seems fairly stable and his mobility is fairly good, and he has fairly good arm swing and not much cogwheeling or rigidity on exam today when he is also seen for evaluation of his Parkinson's disease, his main problem that he does get drowsy on the medication occasionally, but has worked out a system where he can function fairly well he takes Sinemet 25/100 mg pills with . 25 mg of Mirapex and Comtan 300 mg 4 times a day and his off phenomena has gotten a lot less frequent. We mentioned Azilect again, and he would consider that but he does not seem to need it now as he is doing fairly well so we will hold that as an option in the future but previously he could not afford it but now has different insurance.   He is trying to stay fit and plays golf about 4 times a week, and feels that physically he is doing well we did encourage that and urged him to continue exercising because that seems to slow the process of Parkinson's disease down. In addition he has a new problem of increasing pain radiating to both arms, which he had a cervical spine x-ray done that showed moderate degenerative disease of the cervical spine at multiple levels, he was given physical therapy last time and did better, so we told him to continue his exercise program that they gave him, and the muscle relaxant we gave him did not seem to help that much, he does not always take that. He takes it at nighttime because makes him too drowsy in the daytime even taking at night he did not sleep well so we will try him on trazodone to see if he can help him sleep better and maybe he will feel better. .  We will give him tizanidine 4 mg take 1/2 tablet to 1 tablet 3 times a day as needed. He was advised of the side effect as far as sedation and drowsiness. He did not like the Eldepryl, and could not afford the Azilect, and says he has to have a medication that is inexpensive. Other options may be Nourianz, but I think he has to fail his other medicines first. He also has essential tremor and currently takes Mysoline 50 mg a day that doesn't seem to be too out of control. He is trying to exercise regularly, and is able to move fairly well except when he has the off phenomena. Patient's tremor is accentuated when he has to self catheterize himself, which he does 4 times a day, because of neurogenic bladder that occurred after he had abdominal abscess requiring surgery in 2016. The patient noted the tremors more when he tries to do things that responded to Mysoline he became depressed and now is on a half a tablet tolerating that better. He took Lexapro for a while because of depression. He has a family history of tremors in his paternal aunt tremors and and one brother also was chronically abusing alcohol. 1 maternal aunt had Parkinson's disease.   He notices that his voice is softer, the tremor sometimes will occur when he is at rest worse in the right arm compared to the left arm, and he feels that he is moving slower and complains of generalized weakness. He tries exercise regularly, and does not have the endurance he used to. When he goes to bed he has to catheterize himself about 4 hours later he can never go back to bed. He speaks with a soft voice and may have smaller handwriting. Any thing that gets him upset makes his tremor worse. He has had recent W10 level and folic acid levels that were normal, but he does have mild anemia with macrocytosis. He had normal vitamin D levels recently, normal thyroid, normal metabolic screen except for some cholesterol problems. He takes some vitamins on a regular basis. He has no unusual neck or thoracic pain or lumbar pain. His cranial nerves II through XII are otherwise intact has no difficulty speaking or swallowing other than the soft voice. He has had no falls or injuries and no other toxin exposure or neuroleptic exposure. Patient also had a normal carotid Doppler study just recently. He denies any history of stroke or TIA.     Past Medical History:   Diagnosis Date    Arthritis     gouty arthritis in feet & ankles    Gout     HTN (hypertension)       Past Surgical History:   Procedure Laterality Date    COLONOSCOPY N/A 12/28/2016    COLON[facility]COPY performed [facility] at [facility] AM[facility]UL[facility]    HX COLONOSCOPY  2010    HX GI  12/29/2016    LAPAROSCOPIC SIGMOID COLECTOMY/ CYSTOSCOPY/ BILATERAL URETERAL STENT PLACEMENT AND REMOVAL    HX ORTHOPAEDIC      Lumbar surgery age 24    HX UROLOGICAL      bladder     Family History   Problem Relation Age of Onset    Other Mother         old age   Robertha Rumps Heart Disease Father     Diabetes Father     Heart Disease Brother     Alcohol abuse Brother     Other Brother         pancreatitis    Anxiety Child     Depression Child     Attention Deficit Disorder Child Social History     Tobacco Use    Smoking status: Never    Smokeless tobacco: Never   Substance Use Topics    Alcohol use: No         Current Outpatient Medications:     cyanocobalamin (Vitamin B-12) 1,000 mcg tablet, Take 1,000 mcg by mouth daily. , Disp: , Rfl:     traZODone (DESYREL) 50 mg tablet, Take 1 Tablet by mouth nightly., Disp: 30 Tablet, Rfl: 11    carbidopa-levodopa (Sinemet)  mg per tablet, Take 1 Tablet by mouth four (4) times daily. Indications: a type of movement disorder called parkinsonism, restless legs syndrome, an extreme discomfort in the calf muscles when sitting or lying down, Disp: 360 Tablet, Rfl: 4    entacapone (Comtan) 200 mg tablet, Take 1 Tablet by mouth four (4) times daily. , Disp: 360 Tablet, Rfl: 5    pramipexole (MIRAPEX) 0.25 mg tablet, Take 2 Tablets by mouth two (2) times daily (after meals). , Disp: 360 Tablet, Rfl: 5    colchicine (MITIGARE) 0.6 mg capsule, Take 0.6 mg by mouth two (2) times daily as needed. , Disp: , Rfl:     tiZANidine (ZANAFLEX) 4 mg tablet, Take 1 Tablet by mouth three (3) times daily as needed for Muscle Spasm(s). , Disp: 100 Tablet, Rfl: 11    allopurinoL (ZYLOPRIM) 100 mg tablet, Take 200 mg by mouth daily. , Disp: , Rfl:     cholecalciferol (VITAMIN D3) 25 mcg (1,000 unit) cap, Take  by mouth daily. , Disp: , Rfl:         No Known Allergies   MRI Results (most recent):  Results from Orders Only encounter on 08/17/20    MRI BRAIN W WO CONT    Narrative  EXAM:  MRI BRAIN W WO CONT    INDICATION:    tremors    COMPARISON:  None. CONTRAST: 20 ml Dotarem. TECHNIQUE:  Multiplanar multisequence acquisition without and with contrast of the brain. FINDINGS:  Stat diffusion imaging does not show acute ischemic changes. Moderate atrophy, mild nonspecific white matter changes. No extra-axial fluid collection, hemorrhage or shift. Flow voids in major vessels at the base of the brain are present.   No enhancing lesion no masses. Impression  IMPRESSION: Atrophy and white matter disease. No acute findings no mass. Results from Orders Only encounter on 08/17/20    MRI BRAIN W WO CONT    Narrative  EXAM:  MRI BRAIN W WO CONT    INDICATION:    tremors    COMPARISON:  None. CONTRAST: 20 ml Dotarem. TECHNIQUE:  Multiplanar multisequence acquisition without and with contrast of the brain. FINDINGS:  Stat diffusion imaging does not show acute ischemic changes. Moderate atrophy, mild nonspecific white matter changes. No extra-axial fluid collection, hemorrhage or shift. Flow voids in major vessels at the base of the brain are present. No enhancing lesion no masses. Impression  IMPRESSION: Atrophy and white matter disease. No acute findings no mass. Review of Systems:  A comprehensive review of systems was negative except for: Constitutional: positive for fatigue, malaise and weight loss  Genitourinary: positive for urinary incontinence  Musculoskeletal: positive for myalgias, arthralgias, stiff joints and muscle weakness  Neurological: positive for gait problems, tremor and weakness  Behvioral/Psych: positive for anxiety and depression   Vitals:    10/17/22 1141   BP: 138/80   Pulse: 62   Resp: 18   Temp: 97.4 °F (36.3 °C)   SpO2: 97%   Weight: 219 lb 6.4 oz (99.5 kg)   Height: 6' 3\" (1.905 m)     Objective:     I      NEUROLOGICAL EXAM:    Appearance: The patient is well developed, well nourished, provides a coherent history and is in no acute distress. Mental Status: Oriented to time, place and person, and the president, cognitive function is normal and speech is fluent and no aphasia or dysarthria. Mood and affect appropriate. Cranial Nerves:   Intact visual fields. Fundi are benign, disc are flat, no lesions seen on funduscopy. TARAS, EOM's full, no nystagmus, no ptosis. Facial sensation is normal. Corneal reflexes are not tested. Facial movement is symmetric. Hearing is normal bilaterally.  Palate is midline with normal sternocleidomastoid and trapezius muscles are normal. Tongue is midline. Neck without meningismus or bruits  Temporal arteries are not tender or enlarged  TMJ areas are not tender on palpation   Motor:  5/5 strength in upper and lower proximal and distal muscles. Normal bulk and tone throughout except slight increased muscle tone and rigidity and bradykinesia in both upper extremities with reinforcement. No fasciculations. Rapid alternating movement is symmetric and intact bilaterally  Patient does have mild limitation on range of motion of his shoulders, right greater than left indicating probable rotator cuff problem. Reflexes:   Deep tendon reflexes 2+/4 and symmetrical.  No babinski or clonus present   Sensory:   Normal to touch, pinprick and vibration and temperature intact except slight decrease in both feet. DSS is intact   Gait:  Normal gait for patient's age, except that the patient may move a little slowly and has decreased arm swing on the right. .   Tremor:    Mild bilateral intention tremor noted in both upper extremities, and a mild to moderate somewhat fluctuating rest tremor and static tremor in the right upper extremity and mild involvement in the left upper extremity. Patient has mild micrographia and a slightly soft voice. .   Cerebellar:  No abnormal cerebellar signs present on Romberg and tandem testing and finger-nose-finger exam shows the bilateral intention tremor. Neurovascular:  Normal heart sounds and regular rhythm, peripheral pulses decreased, and no carotid bruits. Assessment:       ICD-10-CM ICD-9-CM    1. Weakness generalized  R53.1 780.79 traZODone (DESYREL) 50 mg tablet      2. Parkinson's disease (tremor, stiffness, slow motion, unstable posture) ([facility])  G20 332.0 traZODone (DESYREL) 50 mg tablet      3. Cervical radiculopathy due to degenerative joint disease of spine  M47.22 721.0 traZODone (DESYREL) 50 mg tablet      4.  Benign essential tremor syndrome  G25.0 333.1 traZODone (DESYREL) 50 mg tablet      5. Tremors of nervous system  R25.1 781.0 traZODone (DESYREL) 50 mg tablet        Active Problems:    * No active hospital problems. *      Plan:     Patient with new problem of increasing shoulder pain bilaterally, and there is a vague pain associated with an aching sensation in both shoulders when he sits still, but not associated with a decreased range of motion, he does not seem to have an impingement syndrome today, I suspect this might just be from him using his arms as he stays active, putting a strain on his shoulders, and since he does not want to see an orthopedic surgeon we suggested that he continue his exercise program and gave him trazodone to try to help sleep at night, which may help him rest and decrease his pain he is to continue the melatonin also. If his pain persist we may have to do an EMG study and send him to the orthopedic surgeon. We did reassure him that this is most likely secondary to his arthritis and not his Parkinson's disease. Patient previously seen for problem of patient having increased off spells, despite taking his medication of Sinemet 25/100 mg and Comtan 300 mg faithfully every 6 hours at 6 AM, noon, 6 PM and midnight, and patient intolerant of Eldepryl, and could not afford Azilect, and patient feels he is much better on Mirapex . 25 mg in addition 4 times a day. He will continue that and another trial of Azilect in the future may be needed if he gets worse but right now he is doing okay  He is advised of the side effects as far as increased dyskinesia or GI side effects or any side effect to call us immediately. He has essential tremor that seems stable on Mysoline 50 mg a day.   For his sensation of increasing neck pain and radicular pain into both arms, and muscle tension, he is to get back to doing his neck exercises we will give him some Zanaflex one half of a 4 mg tablet up to 1 4 mg tablet taking it 3 times a day as needed. For his weakness we checked a CPK CAMILLE and sed rate to rule out neuromuscular disease, and an anti-neuronal antibody titer and they were all unremarkable. He also has essential tremor that is mild in both upper extremities he is to continue with  Mysoline since the tremor of essential type really seems mild at this time. For his macrocytosis he will get an antiparietal cell antibody titer just to make sure he does not have pernicious anemia and that was okay  For his chronic insomnia recommend he try melatonin 5 to 10 mg to see if that helps first before resorting to medications. He is to eat a good diet, watch his weight and continue regular exercise program.  We advised to do a combination of cardiovascular strength strengthening exercise, and stretching and balance and flexibility  Patient may need an EMG to evaluate his weakness, we will await the above-mentioned test first.  Recommended a One-A-Day vitamin and vitamin D, and the patient is to continue a regular exercise program, which we advised him can slow the progression of the disease down. Patient will check my chart for results of his test, 1 hour for the patient going over his history, reviewing his labs, looking at his diagnosis and prognosis, and diagnostic testing needed and patient agrees with plans as outlined above. 34 minutes spent with the patient today, discussing his therapeutic options, and other options in the future we discussed with him with dopamine agonist or extended release Comtan or Stalevo or adenosine blockers  He will call if any problem in the interim otherwise we will see him in 3 months time. Signed By: Ulices Viramontes MD     October 17, 2022       CC: Lynn Ken MD  FAX: [phone]        If you have questions, please do not hesitate to call me. I look forward to following your patient along with you.       Sincerely,    Ulices Viramontes MD

## 2022-10-18 NOTE — PROGRESS NOTES
Consult  REFERRED BY:  Latosha Diez MD    CHIEF COMPLAINT: Increased wearing off phenomena and slowness of movement    Subjective:     Aidan Garsia is a 75 y.o. right-handed  male seen at the request of Dr. Bethel Burrows for evaluation of new problem of thinking that he has Parkinson's symptoms as manifest by increasing shoulder pain, bilaterally that hurts more when he just sits still and seems to be an ache in both shoulders but does not really have a restricted movement more recently, and never saw the orthopedic surgeon we sent him to, and thinks it may be more his Parkinson's disease then a rotator cuff problem. He is still not sleeping very well, despite taking melatonin, but his Parkinson's disease seems fairly stable and his mobility is fairly good, and he has fairly good arm swing and not much cogwheeling or rigidity on exam today when he is also seen for evaluation of his Parkinson's disease, his main problem that he does get drowsy on the medication occasionally, but has worked out a system where he can function fairly well he takes Sinemet 25/100 mg pills with . 25 mg of Mirapex and Comtan 300 mg 4 times a day and his off phenomena has gotten a lot less frequent. We mentioned Azilect again, and he would consider that but he does not seem to need it now as he is doing fairly well so we will hold that as an option in the future but previously he could not afford it but now has different insurance. He is trying to stay fit and plays golf about 4 times a week, and feels that physically he is doing well we did encourage that and urged him to continue exercising because that seems to slow the process of Parkinson's disease down.   In addition he has a new problem of increasing pain radiating to both arms, which he had a cervical spine x-ray done that showed moderate degenerative disease of the cervical spine at multiple levels, he was given physical therapy last time and did better, so we told him to continue his exercise program that they gave him, and the muscle relaxant we gave him did not seem to help that much, he does not always take that. He takes it at nighttime because makes him too drowsy in the daytime even taking at night he did not sleep well so we will try him on trazodone to see if he can help him sleep better and maybe he will feel better. .  We will give him tizanidine 4 mg take 1/2 tablet to 1 tablet 3 times a day as needed. He was advised of the side effect as far as sedation and drowsiness. He did not like the Eldepryl, and could not afford the Azilect, and says he has to have a medication that is inexpensive. Other options may be Nourianz, but I think he has to fail his other medicines first. He also has essential tremor and currently takes Mysoline 50 mg a day that doesn't seem to be too out of control. He is trying to exercise regularly, and is able to move fairly well except when he has the off phenomena. Patient's tremor is accentuated when he has to self catheterize himself, which he does 4 times a day, because of neurogenic bladder that occurred after he had abdominal abscess requiring surgery in 2016. The patient noted the tremors more when he tries to do things that responded to Mysoline he became depressed and now is on a half a tablet tolerating that better. He took Lexapro for a while because of depression. He has a family history of tremors in his paternal aunt tremors and and one brother also was chronically abusing alcohol. 1 maternal aunt had Parkinson's disease. He notices that his voice is softer, the tremor sometimes will occur when he is at rest worse in the right arm compared to the left arm, and he feels that he is moving slower and complains of generalized weakness. He tries exercise regularly, and does not have the endurance he used to. When he goes to bed he has to catheterize himself about 4 hours later he can never go back to bed.   He speaks with a soft voice and may have smaller handwriting. Any thing that gets him upset makes his tremor worse. He has had recent R25 level and folic acid levels that were normal, but he does have mild anemia with macrocytosis. He had normal vitamin D levels recently, normal thyroid, normal metabolic screen except for some cholesterol problems. He takes some vitamins on a regular basis. He has no unusual neck or thoracic pain or lumbar pain. His cranial nerves II through XII are otherwise intact has no difficulty speaking or swallowing other than the soft voice. He has had no falls or injuries and no other toxin exposure or neuroleptic exposure. Patient also had a normal carotid Doppler study just recently. He denies any history of stroke or TIA. Patient does state that marijuana helps him the most, I told him to contact a marijuana prescribing doctor and that that works, he can certainly take that at nighttime for his shoulder pain which helps more than anything.     Past Medical History:   Diagnosis Date    Arthritis     gouty arthritis in feet & ankles    Gout     HTN (hypertension)       Past Surgical History:   Procedure Laterality Date    COLONOSCOPY N/A 12/28/2016    COLONOSCOPY performed by Tai Blair MD at [facility] AM[facility] OR    HX COLONOSCOPY  2010    HX GI  12/29/2016    LAPAROSCOPIC SIGMOID COLECTOMY/ CYSTOSCOPY/ BILATERAL URETERAL STENT PLACEMENT AND REMOVAL    HX ORTHOPAEDIC      Lumbar surgery age 24    HX UROLOGICAL      bladder     Family History   Problem Relation Age of Onset    Other Mother         old age    Heart Disease Father     Diabetes Father     Heart Disease Brother     Alcohol abuse Brother     Other Brother         pancreatitis    Anxiety Child     Depression Child     Attention Deficit Disorder Child       Social History     Tobacco Use    Smoking status: Never    Smokeless tobacco: Never   Substance Use Topics    Alcohol use: No         Current Outpatient Medications: cyanocobalamin (Vitamin B-12) 1,000 mcg tablet, Take 1,000 mcg by mouth daily. , Disp: , Rfl:     traZODone (DESYREL) 50 mg tablet, Take 1 Tablet by mouth nightly., Disp: 30 Tablet, Rfl: 11    carbidopa-levodopa (Sinemet)  mg per tablet, Take 1 Tablet by mouth four (4) times daily. Indications: a type of movement disorder called parkinsonism, restless legs syndrome, an extreme discomfort in the calf muscles when sitting or lying down, Disp: 360 Tablet, Rfl: 4    entacapone (Comtan) 200 mg tablet, Take 1 Tablet by mouth four (4) times daily. , Disp: 360 Tablet, Rfl: 5    pramipexole (MIRAPEX) 0.25 mg tablet, Take 2 Tablets by mouth two (2) times daily (after meals). , Disp: 360 Tablet, Rfl: 5    colchicine (MITIGARE) 0.6 mg capsule, Take 0.6 mg by mouth two (2) times daily as needed. , Disp: , Rfl:     tiZANidine (ZANAFLEX) 4 mg tablet, Take 1 Tablet by mouth three (3) times daily as needed for Muscle Spasm(s). , Disp: 100 Tablet, Rfl: 11    allopurinoL (ZYLOPRIM) 100 mg tablet, Take 200 mg by mouth daily. , Disp: , Rfl:     cholecalciferol (VITAMIN D3) 25 mcg (1,000 unit) cap, Take  by mouth daily. , Disp: , Rfl:         No Known Allergies   MRI Results (most recent):  Results from Orders Only encounter on 08/17/20    MRI BRAIN W WO CONT    Narrative  EXAM:  MRI BRAIN W WO CONT    INDICATION:    tremors    COMPARISON:  None. CONTRAST: 20 ml Dotarem. TECHNIQUE:  Multiplanar multisequence acquisition without and with contrast of the brain. FINDINGS:  Stat diffusion imaging does not show acute ischemic changes. Moderate atrophy, mild nonspecific white matter changes. No extra-axial fluid collection, hemorrhage or shift. Flow voids in major vessels at the base of the brain are present. No enhancing lesion no masses. Impression  IMPRESSION: Atrophy and white matter disease. No acute findings no mass.       Results from Orders Only encounter on 08/17/20    MRI BRAIN W WO CONT    Narrative  EXAM:  MRI BRAIN W WO CONT    INDICATION:    tremors    COMPARISON:  None. CONTRAST: 20 ml Dotarem. TECHNIQUE:  Multiplanar multisequence acquisition without and with contrast of the brain. FINDINGS:  Stat diffusion imaging does not show acute ischemic changes. Moderate atrophy, mild nonspecific white matter changes. No extra-axial fluid collection, hemorrhage or shift. Flow voids in major vessels at the base of the brain are present. No enhancing lesion no masses. Impression  IMPRESSION: Atrophy and white matter disease. No acute findings no mass. Review of Systems:  A comprehensive review of systems was negative except for: Constitutional: positive for fatigue, malaise and weight loss  Genitourinary: positive for urinary incontinence  Musculoskeletal: positive for myalgias, arthralgias, stiff joints and muscle weakness  Neurological: positive for gait problems, tremor and weakness  Behvioral/Psych: positive for anxiety and depression   Vitals:    10/17/22 1141   BP: 138/80   Pulse: 62   Resp: 18   Temp: 97.4 °F (36.3 °C)   SpO2: 97%   Weight: 219 lb 6.4 oz (99.5 kg)   Height: 6' 3\" (1.905 m)     Objective:     I      NEUROLOGICAL EXAM:    Appearance: The patient is well developed, well nourished, provides a coherent history and is in no acute distress. Mental Status: Oriented to time, place and person, and the president, cognitive function is normal and speech is fluent and no aphasia or dysarthria. Mood and affect appropriate. Cranial Nerves:   Intact visual fields. Fundi are benign, disc are flat, no lesions seen on funduscopy. TARAS, EOM's full, no nystagmus, no ptosis. Facial sensation is normal. Corneal reflexes are not tested. Facial movement is symmetric. Hearing is normal bilaterally. Palate is midline with normal sternocleidomastoid and trapezius muscles are normal. Tongue is midline.   Neck without meningismus or bruits  Temporal arteries are not tender or enlarged  TMJ areas are not tender on palpation   Motor:  5/5 strength in upper and lower proximal and distal muscles. Normal bulk and tone throughout except slight increased muscle tone and rigidity and bradykinesia in both upper extremities with reinforcement. No fasciculations. Rapid alternating movement is symmetric and intact bilaterally  Patient does have mild limitation on range of motion of his shoulders, right greater than left indicating probable rotator cuff problem. Reflexes:   Deep tendon reflexes 2+/4 and symmetrical.  No babinski or clonus present   Sensory:   Normal to touch, pinprick and vibration and temperature intact except slight decrease in both feet. DSS is intact   Gait:  Normal gait for patient's age, except that the patient may move a little slowly and has decreased arm swing on the right. .   Tremor:    Mild bilateral intention tremor noted in both upper extremities, and a mild to moderate somewhat fluctuating rest tremor and static tremor in the right upper extremity and mild involvement in the left upper extremity. Patient has mild micrographia and a slightly soft voice. .   Cerebellar:  No abnormal cerebellar signs present on Romberg and tandem testing and finger-nose-finger exam shows the bilateral intention tremor. Neurovascular:  Normal heart sounds and regular rhythm, peripheral pulses decreased, and no carotid bruits. Assessment:       ICD-10-CM ICD-9-CM    1. Weakness generalized  R53.1 780.79 traZODone (DESYREL) 50 mg tablet      2. Parkinson's disease (tremor, stiffness, slow motion, unstable posture) ([facility])  G20 332.0 traZODone (DESYREL) 50 mg tablet      3. Cervical radiculopathy due to degenerative joint disease of spine  M47.22 721.0 traZODone (DESYREL) 50 mg tablet      4. Benign essential tremor syndrome  G25.0 333.1 traZODone (DESYREL) 50 mg tablet      5. Tremors of nervous system  R25.1 781.0 traZODone (DESYREL) 50 mg tablet        Active Problems:    * No active hospital problems. *      Plan:     Patient with new problem of increasing shoulder pain bilaterally, and there is a vague pain associated with an aching sensation in both shoulders when he sits still, but not associated with a decreased range of motion, he does not seem to have an impingement syndrome today, I suspect this might just be from him using his arms as he stays active, putting a strain on his shoulders, and since he does not want to see an orthopedic surgeon we suggested that he continue his exercise program and gave him trazodone to try to help sleep at night, which may help him rest and decrease his pain he is to continue the melatonin also. If his pain persist we may have to do an EMG study and send him to the orthopedic surgeon. We did reassure him that this is most likely secondary to his arthritis and not his Parkinson's disease. Patient previously seen for problem of patient having increased off spells, despite taking his medication of Sinemet 25/100 mg and Comtan 300 mg faithfully every 6 hours at 6 AM, noon, 6 PM and midnight, and patient intolerant of Eldepryl, and could not afford Azilect, and patient feels he is much better on Mirapex . 25 mg in addition 4 times a day. He will continue that and another trial of Azilect in the future may be needed if he gets worse but right now he is doing okay  He is advised of the side effects as far as increased dyskinesia or GI side effects or any side effect to call us immediately. He has essential tremor that seems stable on Mysoline 50 mg a day. For his sensation of increasing neck pain and radicular pain into both arms, and muscle tension, he is to get back to doing his neck exercises we will give him some Zanaflex one half of a 4 mg tablet up to 1 4 mg tablet taking it 3 times a day as needed. For his weakness we checked a CPK CAMILLE and sed rate to rule out neuromuscular disease, and an anti-neuronal antibody titer and they were all unremarkable.   He also has essential tremor that is mild in both upper extremities he is to continue with  Mysoline since the tremor of essential type really seems mild at this time. For his macrocytosis he will get an antiparietal cell antibody titer just to make sure he does not have pernicious anemia and that was okay  For his chronic insomnia recommend he try melatonin 5 to 10 mg to see if that helps first before resorting to medications. He is to eat a good diet, watch his weight and continue regular exercise program.  We advised to do a combination of cardiovascular strength strengthening exercise, and stretching and balance and flexibility  Patient may need an EMG to evaluate his weakness, we will await the above-mentioned test first.  Recommended a One-A-Day vitamin and vitamin D, and the patient is to continue a regular exercise program, which we advised him can slow the progression of the disease down. Patient will check my chart for results of his test, 1 hour for the patient going over his history, reviewing his labs, looking at his diagnosis and prognosis, and diagnostic testing needed and patient agrees with plans as outlined above. 34 minutes spent with the patient today, discussing his therapeutic options, and other options in the future we discussed with him with dopamine agonist or extended release Comtan or Stalevo or adenosine blockers  Patient does state that marijuana helps him the most, I told him to contact a marijuana prescribing doctor and that that works, he can certainly take that at nighttime for his shoulder pain which helps more than anything. We went over the safety of marijuana with the patient, I did advise him that if it is abuse that can be a problem but if used in moderation it can be additionally therapeutic, and that smoking is better observed and then p.o. and asked what he is taking. He will call if any problem in the interim otherwise we will see him in 3 months time.           Signed By: Nicolas Lee Kirti Paz MD     October 17, 2022       CC: Rhianna Reyes MD  FAX: [phone]

## 2022-12-03 DIAGNOSIS — M47.22 CERVICAL RADICULOPATHY DUE TO DEGENERATIVE JOINT DISEASE OF SPINE: ICD-10-CM

## 2022-12-03 DIAGNOSIS — G25.0 BENIGN ESSENTIAL TREMOR SYNDROME: ICD-10-CM

## 2022-12-03 DIAGNOSIS — G20 PARKINSON'S DISEASE (TREMOR, STIFFNESS, SLOW MOTION, UNSTABLE POSTURE) (HCC): ICD-10-CM

## 2022-12-03 RX ORDER — PRAMIPEXOLE DIHYDROCHLORIDE 0.25 MG/1
TABLET ORAL
Qty: 360 TABLET | Refills: 0 | Status: SHIPPED | OUTPATIENT
Start: 2022-12-03

## 2022-12-03 RX ORDER — TIZANIDINE 4 MG/1
TABLET ORAL
Qty: 264 TABLET | Refills: 0 | Status: SHIPPED | OUTPATIENT
Start: 2022-12-03

## 2023-02-21 DIAGNOSIS — G20 PARKINSON'S DISEASE (TREMOR, STIFFNESS, SLOW MOTION, UNSTABLE POSTURE) (HCC): ICD-10-CM

## 2023-02-21 DIAGNOSIS — G25.0 BENIGN ESSENTIAL TREMOR SYNDROME: ICD-10-CM

## 2023-02-21 DIAGNOSIS — M47.22 CERVICAL RADICULOPATHY DUE TO DEGENERATIVE JOINT DISEASE OF SPINE: ICD-10-CM

## 2023-02-21 RX ORDER — PRAMIPEXOLE DIHYDROCHLORIDE 0.25 MG/1
TABLET ORAL
Qty: 360 TABLET | Refills: 0 | Status: SHIPPED | OUTPATIENT
Start: 2023-02-21

## 2023-02-28 RX ORDER — TIZANIDINE 4 MG/1
TABLET ORAL
Qty: 264 TABLET | Refills: 0 | Status: SHIPPED | OUTPATIENT
Start: 2023-02-28 | End: 2023-03-03

## 2023-03-03 DIAGNOSIS — M47.22 CERVICAL RADICULOPATHY DUE TO DEGENERATIVE JOINT DISEASE OF SPINE: ICD-10-CM

## 2023-03-03 DIAGNOSIS — G20 PARKINSON'S DISEASE (TREMOR, STIFFNESS, SLOW MOTION, UNSTABLE POSTURE) (HCC): ICD-10-CM

## 2023-03-03 DIAGNOSIS — M25.512 CHRONIC PAIN OF BOTH SHOULDERS: ICD-10-CM

## 2023-03-03 DIAGNOSIS — G25.0 BENIGN ESSENTIAL TREMOR SYNDROME: ICD-10-CM

## 2023-03-03 DIAGNOSIS — G89.29 CHRONIC PAIN OF BOTH SHOULDERS: ICD-10-CM

## 2023-03-03 DIAGNOSIS — M25.511 CHRONIC PAIN OF BOTH SHOULDERS: ICD-10-CM

## 2023-03-03 DIAGNOSIS — R53.1 WEAKNESS GENERALIZED: ICD-10-CM

## 2023-03-03 DIAGNOSIS — R25.1 TREMORS OF NERVOUS SYSTEM: ICD-10-CM

## 2023-03-03 RX ORDER — PRAMIPEXOLE DIHYDROCHLORIDE 0.25 MG/1
TABLET ORAL
Qty: 360 TABLET | Refills: 0 | Status: SHIPPED | OUTPATIENT
Start: 2023-03-03

## 2023-03-03 RX ORDER — CARBIDOPA AND LEVODOPA 25; 100 MG/1; MG/1
TABLET ORAL
Qty: 360 TABLET | Refills: 0 | Status: SHIPPED | OUTPATIENT
Start: 2023-03-03

## 2023-03-03 RX ORDER — TIZANIDINE 4 MG/1
TABLET ORAL
Qty: 264 TABLET | Refills: 0 | Status: SHIPPED | OUTPATIENT
Start: 2023-03-03

## 2023-05-19 RX ORDER — ALLOPURINOL 100 MG/1
200 TABLET ORAL DAILY
COMMUNITY

## 2023-05-19 RX ORDER — ENTACAPONE 200 MG/1
200 TABLET ORAL 4 TIMES DAILY
COMMUNITY
Start: 2022-02-28 | End: 2023-06-01 | Stop reason: SDUPTHER

## 2023-05-19 RX ORDER — PRAMIPEXOLE DIHYDROCHLORIDE 0.25 MG/1
TABLET ORAL
COMMUNITY
Start: 2023-03-03 | End: 2023-06-01 | Stop reason: SDUPTHER

## 2023-05-19 RX ORDER — TRAZODONE HYDROCHLORIDE 50 MG/1
1 TABLET ORAL NIGHTLY
COMMUNITY
Start: 2022-10-17 | End: 2023-06-01 | Stop reason: ALTCHOICE

## 2023-05-19 RX ORDER — COLCHICINE 0.6 MG/1
0.6 CAPSULE ORAL 2 TIMES DAILY PRN
COMMUNITY
Start: 2021-08-05

## 2023-05-19 RX ORDER — TIZANIDINE 4 MG/1
1 TABLET ORAL 3 TIMES DAILY PRN
COMMUNITY
Start: 2023-03-03 | End: 2023-05-25

## 2023-05-25 RX ORDER — TIZANIDINE 4 MG/1
TABLET ORAL
Qty: 270 TABLET | Refills: 1 | Status: SHIPPED | OUTPATIENT
Start: 2023-05-25

## 2023-06-01 ENCOUNTER — OFFICE VISIT (OUTPATIENT)
Age: 68
End: 2023-06-01
Payer: MEDICARE

## 2023-06-01 VITALS
HEIGHT: 75 IN | RESPIRATION RATE: 16 BRPM | HEART RATE: 61 BPM | DIASTOLIC BLOOD PRESSURE: 72 MMHG | WEIGHT: 219 LBS | BODY MASS INDEX: 27.23 KG/M2 | SYSTOLIC BLOOD PRESSURE: 130 MMHG | OXYGEN SATURATION: 100 %

## 2023-06-01 DIAGNOSIS — G25.0 BENIGN ESSENTIAL TREMOR SYNDROME: ICD-10-CM

## 2023-06-01 DIAGNOSIS — G20 PARKINSON'S DISEASE (TREMOR, STIFFNESS, SLOW MOTION, UNSTABLE POSTURE) (HCC): ICD-10-CM

## 2023-06-01 DIAGNOSIS — M47.22 CERVICAL RADICULOPATHY DUE TO DEGENERATIVE JOINT DISEASE OF SPINE: Primary | ICD-10-CM

## 2023-06-01 DIAGNOSIS — N52.9 VASCULOGENIC ERECTILE DYSFUNCTION, UNSPECIFIED VASCULOGENIC ERECTILE DYSFUNCTION TYPE: ICD-10-CM

## 2023-06-01 DIAGNOSIS — R47.9 SPEECH PROBLEM: ICD-10-CM

## 2023-06-01 PROCEDURE — G8427 DOCREV CUR MEDS BY ELIG CLIN: HCPCS | Performed by: PSYCHIATRY & NEUROLOGY

## 2023-06-01 PROCEDURE — G8419 CALC BMI OUT NRM PARAM NOF/U: HCPCS | Performed by: PSYCHIATRY & NEUROLOGY

## 2023-06-01 PROCEDURE — 1036F TOBACCO NON-USER: CPT | Performed by: PSYCHIATRY & NEUROLOGY

## 2023-06-01 PROCEDURE — 1123F ACP DISCUSS/DSCN MKR DOCD: CPT | Performed by: PSYCHIATRY & NEUROLOGY

## 2023-06-01 PROCEDURE — 3017F COLORECTAL CA SCREEN DOC REV: CPT | Performed by: PSYCHIATRY & NEUROLOGY

## 2023-06-01 PROCEDURE — 99215 OFFICE O/P EST HI 40 MIN: CPT | Performed by: PSYCHIATRY & NEUROLOGY

## 2023-06-01 RX ORDER — PRAMIPEXOLE DIHYDROCHLORIDE 0.25 MG/1
TABLET ORAL
Qty: 360 TABLET | Refills: 4 | Status: SHIPPED | OUTPATIENT
Start: 2023-06-01

## 2023-06-01 RX ORDER — MULTIVITAMIN WITH IRON
250 TABLET ORAL DAILY
COMMUNITY

## 2023-06-01 RX ORDER — TIZANIDINE 4 MG/1
TABLET ORAL
Qty: 270 TABLET | Refills: 4 | Status: SHIPPED | OUTPATIENT
Start: 2023-06-01

## 2023-06-01 RX ORDER — TADALAFIL 10 MG/1
10 TABLET ORAL DAILY PRN
Qty: 10 TABLET | Refills: 11 | Status: SHIPPED | OUTPATIENT
Start: 2023-06-01

## 2023-06-01 RX ORDER — MULTIVIT WITH MINERALS/LUTEIN
250 TABLET ORAL DAILY
COMMUNITY

## 2023-06-01 RX ORDER — ENTACAPONE 200 MG/1
200 TABLET ORAL 4 TIMES DAILY
Qty: 360 TABLET | Refills: 4 | Status: SHIPPED | OUTPATIENT
Start: 2023-06-01

## 2023-06-01 RX ORDER — THIAMINE MONONITRATE (VIT B1) 100 MG
100 TABLET ORAL DAILY
COMMUNITY

## 2023-06-01 ASSESSMENT — PATIENT HEALTH QUESTIONNAIRE - PHQ9
1. LITTLE INTEREST OR PLEASURE IN DOING THINGS: 0
SUM OF ALL RESPONSES TO PHQ QUESTIONS 1-9: 1
2. FEELING DOWN, DEPRESSED OR HOPELESS: 1
SUM OF ALL RESPONSES TO PHQ9 QUESTIONS 1 & 2: 1
SUM OF ALL RESPONSES TO PHQ QUESTIONS 1-9: 1

## 2023-06-02 ENCOUNTER — CLINICAL DOCUMENTATION (OUTPATIENT)
Age: 68
End: 2023-06-02

## 2023-06-02 NOTE — PROGRESS NOTES
Faxed referral along with last OV note, copy of insurance cards and demographics to  [address] [place] [address] # [phone].

## 2023-06-02 NOTE — PROGRESS NOTES
1. Have you been to the ER, urgent care clinic since your last visit? Hospitalized since your last visit? No.    2. Have you seen or consulted any other health care providers outside of the [address] [place] [address] since your last visit? Include any pap smears or colon screening.    No.      Chief Complaint   Patient presents with    Neurologic Problem     6 month on Parkinson's- arms, neck shoulder throbbing and head also- ED also
Diana Henderson MD  FAX: [phone]

## 2024-01-31 ENCOUNTER — OFFICE VISIT (OUTPATIENT)
Age: 69
End: 2024-01-31
Payer: MEDICARE

## 2024-01-31 VITALS
DIASTOLIC BLOOD PRESSURE: 80 MMHG | WEIGHT: 228.7 LBS | HEIGHT: 75 IN | OXYGEN SATURATION: 100 % | HEART RATE: 65 BPM | SYSTOLIC BLOOD PRESSURE: 136 MMHG | TEMPERATURE: 97.8 F | RESPIRATION RATE: 17 BRPM | BODY MASS INDEX: 28.44 KG/M2

## 2024-01-31 DIAGNOSIS — R53.1 WEAKNESS GENERALIZED: ICD-10-CM

## 2024-01-31 DIAGNOSIS — M47.22 CERVICAL RADICULOPATHY DUE TO DEGENERATIVE JOINT DISEASE OF SPINE: ICD-10-CM

## 2024-01-31 DIAGNOSIS — G25.0 BENIGN ESSENTIAL TREMOR SYNDROME: Primary | ICD-10-CM

## 2024-01-31 DIAGNOSIS — R25.1 TREMORS OF NERVOUS SYSTEM: ICD-10-CM

## 2024-01-31 DIAGNOSIS — G20.A2 PARKINSON'S DISEASE WITHOUT DYSKINESIA, WITH FLUCTUATING MANIFESTATIONS: ICD-10-CM

## 2024-01-31 PROBLEM — G20.A1 PARKINSON'S DISEASE: Status: ACTIVE | Noted: 2024-01-31

## 2024-01-31 PROCEDURE — 1123F ACP DISCUSS/DSCN MKR DOCD: CPT | Performed by: PSYCHIATRY & NEUROLOGY

## 2024-01-31 PROCEDURE — 1036F TOBACCO NON-USER: CPT | Performed by: PSYCHIATRY & NEUROLOGY

## 2024-01-31 PROCEDURE — 3017F COLORECTAL CA SCREEN DOC REV: CPT | Performed by: PSYCHIATRY & NEUROLOGY

## 2024-01-31 PROCEDURE — G8484 FLU IMMUNIZE NO ADMIN: HCPCS | Performed by: PSYCHIATRY & NEUROLOGY

## 2024-01-31 PROCEDURE — 99214 OFFICE O/P EST MOD 30 MIN: CPT | Performed by: PSYCHIATRY & NEUROLOGY

## 2024-01-31 PROCEDURE — G8427 DOCREV CUR MEDS BY ELIG CLIN: HCPCS | Performed by: PSYCHIATRY & NEUROLOGY

## 2024-01-31 PROCEDURE — G8419 CALC BMI OUT NRM PARAM NOF/U: HCPCS | Performed by: PSYCHIATRY & NEUROLOGY

## 2024-01-31 RX ORDER — PRIMIDONE 50 MG/1
50 TABLET ORAL NIGHTLY
Qty: 90 TABLET | Refills: 3 | Status: SHIPPED | OUTPATIENT
Start: 2024-01-31

## 2024-01-31 RX ORDER — ENTACAPONE 200 MG/1
200 TABLET ORAL 4 TIMES DAILY
Qty: 360 TABLET | Refills: 4 | Status: SHIPPED | OUTPATIENT
Start: 2024-01-31

## 2024-01-31 ASSESSMENT — PATIENT HEALTH QUESTIONNAIRE - PHQ9
1. LITTLE INTEREST OR PLEASURE IN DOING THINGS: 0
SUM OF ALL RESPONSES TO PHQ QUESTIONS 1-9: 0
SUM OF ALL RESPONSES TO PHQ QUESTIONS 1-9: 0
SUM OF ALL RESPONSES TO PHQ9 QUESTIONS 1 & 2: 0
SUM OF ALL RESPONSES TO PHQ QUESTIONS 1-9: 0
2. FEELING DOWN, DEPRESSED OR HOPELESS: 0
SUM OF ALL RESPONSES TO PHQ QUESTIONS 1-9: 0

## 2024-01-31 NOTE — PROGRESS NOTES
should be able to tolerate the Mysoline.  We went over all the side effects as far as drowsiness, sedation, dizziness, ataxia, abdominal upset, he will try the medication and see how it works.  We strongly congratulated him on all his exercise, neither 100% better, so hopefully he will keep doing that.  Patient with increasing tremors, the rest type, and I suggested to him that he needs to do to get back on his Comtan which will help make the Sinemet less closer, and we increased his Sinemet dose to 5 a day, the other options of adding on Azilect or other Parkinson's treatments did not appear with him at this time.  He is to keep these medications going and call us if he has any problem.  He is to continue his regular exercise program doing great, and continue his diet with a Mediterranean type diet, and call if he has any problem on the medication or side effects.  He is to start taking a multivitamin and vitamin D every day.  35 minutes spent with the patient and his wife going over all of these options and treatment and discussing this in detail, discussing his diagnosis prognosis and treatment options.    Signed By: Gary Stewart MD     January 31, 2024       CC: Rohit Love MD  FAX: [phone]

## 2024-07-08 DIAGNOSIS — G20.A1 PARKINSON'S DISEASE (TREMOR, STIFFNESS, SLOW MOTION, UNSTABLE POSTURE) (HCC): ICD-10-CM

## 2024-07-08 RX ORDER — TIZANIDINE 4 MG/1
TABLET ORAL
Qty: 270 TABLET | Refills: 4 | Status: SHIPPED | OUTPATIENT
Start: 2024-07-08

## 2024-07-08 RX ORDER — PRAMIPEXOLE DIHYDROCHLORIDE 0.25 MG/1
TABLET ORAL
Qty: 360 TABLET | Refills: 2 | Status: SHIPPED | OUTPATIENT
Start: 2024-07-08

## 2024-08-27 ENCOUNTER — OFFICE VISIT (OUTPATIENT)
Age: 69
End: 2024-08-27
Payer: MEDICARE

## 2024-08-27 VITALS
HEIGHT: 75 IN | RESPIRATION RATE: 16 BRPM | WEIGHT: 204.8 LBS | DIASTOLIC BLOOD PRESSURE: 70 MMHG | BODY MASS INDEX: 25.47 KG/M2 | TEMPERATURE: 98.7 F | SYSTOLIC BLOOD PRESSURE: 116 MMHG | OXYGEN SATURATION: 97 % | HEART RATE: 71 BPM

## 2024-08-27 DIAGNOSIS — R53.1 WEAKNESS GENERALIZED: ICD-10-CM

## 2024-08-27 DIAGNOSIS — G20.A2 PARKINSON'S DISEASE WITHOUT DYSKINESIA, WITH FLUCTUATING MANIFESTATIONS (HCC): ICD-10-CM

## 2024-08-27 DIAGNOSIS — G25.0 BENIGN ESSENTIAL TREMOR SYNDROME: Primary | ICD-10-CM

## 2024-08-27 DIAGNOSIS — M47.22 CERVICAL RADICULOPATHY DUE TO DEGENERATIVE JOINT DISEASE OF SPINE: ICD-10-CM

## 2024-08-27 DIAGNOSIS — R25.1 TREMORS OF NERVOUS SYSTEM: ICD-10-CM

## 2024-08-27 PROCEDURE — 99214 OFFICE O/P EST MOD 30 MIN: CPT | Performed by: PSYCHIATRY & NEUROLOGY

## 2024-08-27 PROCEDURE — G8419 CALC BMI OUT NRM PARAM NOF/U: HCPCS | Performed by: PSYCHIATRY & NEUROLOGY

## 2024-08-27 PROCEDURE — 1123F ACP DISCUSS/DSCN MKR DOCD: CPT | Performed by: PSYCHIATRY & NEUROLOGY

## 2024-08-27 PROCEDURE — 1036F TOBACCO NON-USER: CPT | Performed by: PSYCHIATRY & NEUROLOGY

## 2024-08-27 PROCEDURE — 3017F COLORECTAL CA SCREEN DOC REV: CPT | Performed by: PSYCHIATRY & NEUROLOGY

## 2024-08-27 PROCEDURE — G8427 DOCREV CUR MEDS BY ELIG CLIN: HCPCS | Performed by: PSYCHIATRY & NEUROLOGY

## 2024-08-27 RX ORDER — VITAMIN B COMPLEX
1 CAPSULE ORAL DAILY
COMMUNITY

## 2024-08-27 ASSESSMENT — PATIENT HEALTH QUESTIONNAIRE - PHQ9
2. FEELING DOWN, DEPRESSED OR HOPELESS: NOT AT ALL
SUM OF ALL RESPONSES TO PHQ QUESTIONS 1-9: 0
SUM OF ALL RESPONSES TO PHQ9 QUESTIONS 1 & 2: 0
SUM OF ALL RESPONSES TO PHQ QUESTIONS 1-9: 0
1. LITTLE INTEREST OR PLEASURE IN DOING THINGS: NOT AT ALL

## 2024-08-27 NOTE — PROGRESS NOTES
Consult  REFERRED BY:  Rohit Love MD    CHIEF COMPLAINT: Parkinson's disease and patient with new problem of complaints of chronic fatigue      Subjective:     Joe Mason is a 69 y.o.right-handed  male seen at the request of Dr. Love for evaluation of new problem of patient having more difficulty with still feeling chronically fatigued, but he looks dramatically better, she has been going to the gym works out fairly hard 6 days a week now, and there is loss 25 to 30 pounds, and moves very well and does not seem to have any gait instability or postural instability and really does not have that much tremor or rigidity present.  He still has some essential tremor, right hand greater than the left, and we encouraged him to start 25 mg of Mysoline or primidone at nighttime and cut his Zanaflex in half and see if he can tolerate the Mysoline, and then slowly advance that to therapy doses as tolerated.  Both the patient and his wife are willing to try that.  He he has restarted his Comtan and he feels he is much better on that medication taking it 4 times a day we will continue that.  Patient is already on 5 Sinemet a day, and 5 pramipexole a day, and will go back on the Comtan 4 times a day.  The next medicine to try may be Azilect again because it is cheaper now since it went generic, and he did not like the Eldepryl.  He moves fairly well because he is good doing yoga about an hour every morning, and goes to the gym 5 times a day and does a mixture of strengthening and resistive exercises and some cardiovascular exercises and moves amazingly well now, and hardly has any mobility problems of the Parkinson's disease, just the tremor which is still moderate and essential tremor. We told him to go back on the Comtan again and a new prescription, because we told him that makes the Sinemet last longer.  He does find that if he takes an extra Sinemet within an hour he has less tremors.  We will also  COLONOSCOPY N/A 12/28/2016    COLONOSCOPY performed by Jesus Taylor MD at [facility] AMBULATORY OR    COLONOSCOPY  2010    GI  12/29/2016    LAPAROSCOPIC SIGMOID COLECTOMY/ CYSTOSCOPY/ BILATERAL URETERAL STENT PLACEMENT AND REMOVAL    ORTHOPEDIC SURGERY      Lumbar surgery age 17    UROLOGICAL SURGERY      bladder     Family History   Problem Relation Age of Onset    Attention Deficit Disorder Child     Other Mother         old age    Depression Child     Anxiety Disorder Child     Other Brother         pancreatitis    Heart Disease Father     Diabetes Father     Heart Disease Brother     Alcohol Abuse Brother       Social History     Tobacco Use    Smoking status: Never    Smokeless tobacco: Never   Substance Use Topics    Alcohol use: No         Current Outpatient Medications:     Cranberry 125 MG TABS, Take by mouth, Disp: , Rfl:     b complex vitamins capsule, Take 1 capsule by mouth daily, Disp: , Rfl:     tiZANidine (ZANAFLEX) 4 MG tablet, TAKE 1 TABLET BY MOUTH 3 TIMES DAILY AS NEEDED FOR MUSCLE SPASM, Disp: 270 tablet, Rfl: 4    carbidopa-levodopa (SINEMET)  MG per tablet, TAKE 1 TABLET BY MOUTH 5 TIMES DAILY, Disp: 450 tablet, Rfl: 2    pramipexole (MIRAPEX) 0.25 MG tablet, TAKE 2 TABLETS BY MOUTH TWICE DAILY AFTER A MEAL, Disp: 360 tablet, Rfl: 2    entacapone (COMTAN) 200 MG tablet, Take 1 tablet by mouth 4 times daily, Disp: 360 tablet, Rfl: 4    primidone (MYSOLINE) 50 MG tablet, Take 1 tablet by mouth nightly, Disp: 90 tablet, Rfl: 3    vitamin B-1 (THIAMINE) 100 MG tablet, Take 1 tablet by mouth daily, Disp: , Rfl:     Ascorbic Acid (VITAMIN C) 250 MG tablet, Take 1 tablet by mouth daily, Disp: , Rfl:     magnesium (MAGNESIUM-OXIDE) 250 MG TABS tablet, Take 1 tablet by mouth daily, Disp: , Rfl:     allopurinol (ZYLOPRIM) 100 MG tablet, Take 2 tablets by mouth daily, Disp: , Rfl:     vitamin D 25 MCG (1000 UT) CAPS, Take by mouth daily, Disp: , Rfl:     colchicine (MITIGARE) 0.6 MG capsule, Take

## 2024-09-04 DIAGNOSIS — G20.A2 PARKINSON'S DISEASE WITHOUT DYSKINESIA, WITH FLUCTUATING MANIFESTATIONS (HCC): ICD-10-CM

## 2024-09-04 DIAGNOSIS — R53.1 WEAKNESS GENERALIZED: ICD-10-CM

## 2024-09-04 DIAGNOSIS — M47.22 CERVICAL RADICULOPATHY DUE TO DEGENERATIVE JOINT DISEASE OF SPINE: ICD-10-CM

## 2024-09-04 DIAGNOSIS — G25.0 BENIGN ESSENTIAL TREMOR SYNDROME: ICD-10-CM

## 2024-09-04 DIAGNOSIS — R25.1 TREMORS OF NERVOUS SYSTEM: ICD-10-CM

## 2024-09-04 RX ORDER — ENTACAPONE 200 MG/1
200 TABLET ORAL 4 TIMES DAILY
Qty: 360 TABLET | Refills: 4 | Status: SHIPPED | OUTPATIENT
Start: 2024-09-04

## 2024-09-04 NOTE — TELEPHONE ENCOUNTER
Patient is asking for a refill of entacapone a 90-day supply. He states it goes through \"[employer].\"     Patient states he does not have their address or phone number so he will look online and call us back with that information. Thank you.

## 2024-09-04 NOTE — TELEPHONE ENCOUNTER
Patient called back stating he is running low and needs this medication refilled asap as [place]. Please advise.    entacapone (COMTAN) 200 MG tablet     [facility]  [address] [place] [address]  Ph #:  [phone]

## 2024-12-14 DIAGNOSIS — M47.22 CERVICAL RADICULOPATHY DUE TO DEGENERATIVE JOINT DISEASE OF SPINE: ICD-10-CM

## 2024-12-14 DIAGNOSIS — G25.0 BENIGN ESSENTIAL TREMOR SYNDROME: ICD-10-CM

## 2024-12-14 DIAGNOSIS — R53.1 WEAKNESS GENERALIZED: ICD-10-CM

## 2024-12-14 DIAGNOSIS — G20.A2 PARKINSON'S DISEASE WITHOUT DYSKINESIA, WITH FLUCTUATING MANIFESTATIONS (HCC): ICD-10-CM

## 2024-12-14 DIAGNOSIS — R25.1 TREMORS OF NERVOUS SYSTEM: ICD-10-CM

## 2024-12-16 RX ORDER — PRIMIDONE 50 MG/1
50 TABLET ORAL NIGHTLY
Qty: 90 TABLET | Refills: 3 | Status: SHIPPED | OUTPATIENT
Start: 2024-12-16

## 2025-02-11 ENCOUNTER — TELEPHONE (OUTPATIENT)
Age: 70
End: 2025-02-11

## 2025-02-13 ENCOUNTER — OFFICE VISIT (OUTPATIENT)
Age: 70
End: 2025-02-13
Payer: MEDICARE

## 2025-02-13 ENCOUNTER — CLINICAL DOCUMENTATION (OUTPATIENT)
Age: 70
End: 2025-02-13

## 2025-02-13 VITALS
BODY MASS INDEX: 25.59 KG/M2 | RESPIRATION RATE: 17 BRPM | HEART RATE: 65 BPM | WEIGHT: 205.8 LBS | HEIGHT: 75 IN | OXYGEN SATURATION: 99 % | SYSTOLIC BLOOD PRESSURE: 122 MMHG | DIASTOLIC BLOOD PRESSURE: 60 MMHG | TEMPERATURE: 98.3 F

## 2025-02-13 DIAGNOSIS — G20.A2 PARKINSON'S DISEASE WITHOUT DYSKINESIA, WITH FLUCTUATING MANIFESTATIONS (HCC): Primary | ICD-10-CM

## 2025-02-13 DIAGNOSIS — G25.0 BENIGN ESSENTIAL TREMOR SYNDROME: ICD-10-CM

## 2025-02-13 DIAGNOSIS — R25.1 TREMORS OF NERVOUS SYSTEM: ICD-10-CM

## 2025-02-13 PROCEDURE — G8419 CALC BMI OUT NRM PARAM NOF/U: HCPCS | Performed by: PSYCHIATRY & NEUROLOGY

## 2025-02-13 PROCEDURE — 99214 OFFICE O/P EST MOD 30 MIN: CPT | Performed by: PSYCHIATRY & NEUROLOGY

## 2025-02-13 PROCEDURE — G8427 DOCREV CUR MEDS BY ELIG CLIN: HCPCS | Performed by: PSYCHIATRY & NEUROLOGY

## 2025-02-13 PROCEDURE — 3017F COLORECTAL CA SCREEN DOC REV: CPT | Performed by: PSYCHIATRY & NEUROLOGY

## 2025-02-13 PROCEDURE — 1160F RVW MEDS BY RX/DR IN RCRD: CPT | Performed by: PSYCHIATRY & NEUROLOGY

## 2025-02-13 PROCEDURE — 1123F ACP DISCUSS/DSCN MKR DOCD: CPT | Performed by: PSYCHIATRY & NEUROLOGY

## 2025-02-13 PROCEDURE — 1126F AMNT PAIN NOTED NONE PRSNT: CPT | Performed by: PSYCHIATRY & NEUROLOGY

## 2025-02-13 PROCEDURE — 1036F TOBACCO NON-USER: CPT | Performed by: PSYCHIATRY & NEUROLOGY

## 2025-02-13 PROCEDURE — 1159F MED LIST DOCD IN RCRD: CPT | Performed by: PSYCHIATRY & NEUROLOGY

## 2025-02-13 ASSESSMENT — PATIENT HEALTH QUESTIONNAIRE - PHQ9
SUM OF ALL RESPONSES TO PHQ9 QUESTIONS 1 & 2: 0
SUM OF ALL RESPONSES TO PHQ QUESTIONS 1-9: 0
1. LITTLE INTEREST OR PLEASURE IN DOING THINGS: NOT AT ALL
SUM OF ALL RESPONSES TO PHQ QUESTIONS 1-9: 0
2. FEELING DOWN, DEPRESSED OR HOPELESS: NOT AT ALL

## 2025-02-14 NOTE — PROGRESS NOTES
Depression Child     Anxiety Disorder Child     Other Brother         pancreatitis    Heart Disease Father     Diabetes Father     Heart Disease Brother     Alcohol Abuse Brother       Social History     Tobacco Use    Smoking status: Never    Smokeless tobacco: Never   Substance Use Topics    Alcohol use: No         Current Outpatient Medications:     entacapone (COMTAN) 200 MG tablet, Take 1 tablet by mouth 4 times daily, Disp: 360 tablet, Rfl: 4    Cranberry 125 MG TABS, Take by mouth, Disp: , Rfl:     b complex vitamins capsule, Take 1 capsule by mouth daily, Disp: , Rfl:     carbidopa-levodopa (SINEMET)  MG per tablet, TAKE 1 TABLET BY MOUTH 5 TIMES DAILY, Disp: 450 tablet, Rfl: 2    pramipexole (MIRAPEX) 0.25 MG tablet, TAKE 2 TABLETS BY MOUTH TWICE DAILY AFTER A MEAL, Disp: 360 tablet, Rfl: 2    Ascorbic Acid (VITAMIN C) 250 MG tablet, Take 1 tablet by mouth daily, Disp: , Rfl:     magnesium (MAGNESIUM-OXIDE) 250 MG TABS tablet, Take 1 tablet by mouth daily, Disp: , Rfl:     allopurinol (ZYLOPRIM) 100 MG tablet, Take 2 tablets by mouth daily, Disp: , Rfl:     vitamin D 25 MCG (1000 UT) CAPS, Take by mouth daily, Disp: , Rfl:     colchicine (MITIGARE) 0.6 MG capsule, Take 1 capsule by mouth 2 times daily as needed, Disp: , Rfl:     primidone (MYSOLINE) 50 MG tablet, TAKE 1 TABLET BY MOUTH EVERY DAY AT NIGHT (Patient not taking: Reported on 2/13/2025), Disp: 90 tablet, Rfl: 3    tiZANidine (ZANAFLEX) 4 MG tablet, TAKE 1 TABLET BY MOUTH 3 TIMES DAILY AS NEEDED FOR MUSCLE SPASM (Patient not taking: Reported on 2/13/2025), Disp: 270 tablet, Rfl: 4    vitamin B-1 (THIAMINE) 100 MG tablet, Take 1 tablet by mouth daily (Patient not taking: Reported on 2/13/2025), Disp: , Rfl:         No Known Allergies   MRI Results (most recent):  @[facility]([id number])@    @[facility]([id number])@  Review of Systems:  A comprehensive review of systems was negative except for: Neurological: positive for coordination

## 2025-03-30 DIAGNOSIS — R25.1 TREMORS OF NERVOUS SYSTEM: ICD-10-CM

## 2025-03-30 DIAGNOSIS — G20.A2 PARKINSON'S DISEASE WITHOUT DYSKINESIA, WITH FLUCTUATING MANIFESTATIONS (HCC): ICD-10-CM

## 2025-03-30 DIAGNOSIS — R53.1 WEAKNESS GENERALIZED: ICD-10-CM

## 2025-03-30 DIAGNOSIS — M47.22 CERVICAL RADICULOPATHY DUE TO DEGENERATIVE JOINT DISEASE OF SPINE: ICD-10-CM

## 2025-03-30 DIAGNOSIS — G20.A1 PARKINSON'S DISEASE (TREMOR, STIFFNESS, SLOW MOTION, UNSTABLE POSTURE) (HCC): ICD-10-CM

## 2025-03-30 DIAGNOSIS — G25.0 BENIGN ESSENTIAL TREMOR SYNDROME: ICD-10-CM

## 2025-03-31 RX ORDER — CARBIDOPA AND LEVODOPA 25; 100 MG/1; MG/1
TABLET ORAL
Qty: 450 TABLET | Refills: 3 | Status: SHIPPED | OUTPATIENT
Start: 2025-03-31

## 2025-03-31 RX ORDER — PRAMIPEXOLE DIHYDROCHLORIDE 0.25 MG/1
TABLET ORAL
Qty: 360 TABLET | Refills: 2 | OUTPATIENT
Start: 2025-03-31

## 2025-03-31 NOTE — TELEPHONE ENCOUNTER
Last office visit: 02/13/2025  Next office visit: 09/04/2025  Last med refill:      carbidopa-levodopa (SINEMET)  MG per tablet  07/08/2024    primidone (MYSOLINE) 50 MG tablet 12/16/2024

## 2025-06-24 DIAGNOSIS — G20.A1 PARKINSON'S DISEASE (TREMOR, STIFFNESS, SLOW MOTION, UNSTABLE POSTURE) (HCC): ICD-10-CM

## 2025-06-25 RX ORDER — PRAMIPEXOLE DIHYDROCHLORIDE 0.25 MG/1
TABLET ORAL
Qty: 360 TABLET | Refills: 2 | Status: SHIPPED | OUTPATIENT
Start: 2025-06-25

## 2025-09-04 ENCOUNTER — OFFICE VISIT (OUTPATIENT)
Age: 70
End: 2025-09-04
Payer: MEDICARE

## 2025-09-04 VITALS
SYSTOLIC BLOOD PRESSURE: 124 MMHG | DIASTOLIC BLOOD PRESSURE: 76 MMHG | RESPIRATION RATE: 17 BRPM | TEMPERATURE: 98.2 F | WEIGHT: 207.8 LBS | HEART RATE: 56 BPM | BODY MASS INDEX: 25.84 KG/M2 | HEIGHT: 75 IN | OXYGEN SATURATION: 99 %

## 2025-09-04 DIAGNOSIS — R53.1 WEAKNESS GENERALIZED: ICD-10-CM

## 2025-09-04 DIAGNOSIS — R25.1 TREMORS OF NERVOUS SYSTEM: ICD-10-CM

## 2025-09-04 DIAGNOSIS — M47.22 CERVICAL RADICULOPATHY DUE TO DEGENERATIVE JOINT DISEASE OF SPINE: Primary | ICD-10-CM

## 2025-09-04 DIAGNOSIS — G25.0 BENIGN ESSENTIAL TREMOR SYNDROME: ICD-10-CM

## 2025-09-04 DIAGNOSIS — G20.A2 PARKINSON'S DISEASE WITHOUT DYSKINESIA, WITH FLUCTUATING MANIFESTATIONS (HCC): ICD-10-CM

## 2025-09-04 PROCEDURE — G8419 CALC BMI OUT NRM PARAM NOF/U: HCPCS | Performed by: PSYCHIATRY & NEUROLOGY

## 2025-09-04 PROCEDURE — 1123F ACP DISCUSS/DSCN MKR DOCD: CPT | Performed by: PSYCHIATRY & NEUROLOGY

## 2025-09-04 PROCEDURE — 99213 OFFICE O/P EST LOW 20 MIN: CPT | Performed by: PSYCHIATRY & NEUROLOGY

## 2025-09-04 PROCEDURE — 1126F AMNT PAIN NOTED NONE PRSNT: CPT | Performed by: PSYCHIATRY & NEUROLOGY

## 2025-09-04 PROCEDURE — 3017F COLORECTAL CA SCREEN DOC REV: CPT | Performed by: PSYCHIATRY & NEUROLOGY

## 2025-09-04 PROCEDURE — 1036F TOBACCO NON-USER: CPT | Performed by: PSYCHIATRY & NEUROLOGY

## 2025-09-04 PROCEDURE — 1160F RVW MEDS BY RX/DR IN RCRD: CPT | Performed by: PSYCHIATRY & NEUROLOGY

## 2025-09-04 PROCEDURE — G8427 DOCREV CUR MEDS BY ELIG CLIN: HCPCS | Performed by: PSYCHIATRY & NEUROLOGY

## 2025-09-04 PROCEDURE — 1159F MED LIST DOCD IN RCRD: CPT | Performed by: PSYCHIATRY & NEUROLOGY

## 2025-09-04 RX ORDER — PRAMIPEXOLE DIHYDROCHLORIDE 0.25 MG/1
0.25 TABLET ORAL 4 TIMES DAILY
Qty: 360 TABLET | Refills: 3 | Status: SHIPPED | OUTPATIENT
Start: 2025-09-04

## 2025-09-04 RX ORDER — ENTACAPONE 200 MG/1
200 TABLET ORAL 4 TIMES DAILY
Qty: 360 TABLET | Refills: 4 | Status: SHIPPED | OUTPATIENT
Start: 2025-09-04